# Patient Record
Sex: MALE | Race: WHITE | Employment: FULL TIME | URBAN - NONMETROPOLITAN AREA
[De-identification: names, ages, dates, MRNs, and addresses within clinical notes are randomized per-mention and may not be internally consistent; named-entity substitution may affect disease eponyms.]

---

## 2018-06-19 ENCOUNTER — OFFICE VISIT (OUTPATIENT)
Dept: FAMILY MEDICINE CLINIC | Facility: CLINIC | Age: 33
End: 2018-06-19

## 2018-06-19 VITALS
WEIGHT: 205 LBS | TEMPERATURE: 98 F | OXYGEN SATURATION: 98 % | HEIGHT: 71.5 IN | BODY MASS INDEX: 28.07 KG/M2 | DIASTOLIC BLOOD PRESSURE: 96 MMHG | HEART RATE: 76 BPM | SYSTOLIC BLOOD PRESSURE: 148 MMHG

## 2018-06-19 DIAGNOSIS — R42 ORTHOSTATIC LIGHTHEADEDNESS: Primary | ICD-10-CM

## 2018-06-19 DIAGNOSIS — E86.0 DEHYDRATION: ICD-10-CM

## 2018-06-19 PROCEDURE — 99213 OFFICE O/P EST LOW 20 MIN: CPT | Performed by: FAMILY MEDICINE

## 2018-06-19 NOTE — PROGRESS NOTES
Aiden Velasquez is a 26year old male. Patient presents with:  Dizziness: SAT.  GOLFED ALL DAY-- SUNDAY WAS FINE-- MONDAY WOKE UP FEELING DIZZY, NAUSEA, FATIGUE-    HPI:   Felt off balance yesterday am, nausea, no emesis,3 loose stools, hot flashes, decreas negative Romberg  ASSESSMENT AND PLAN:   Orthostatic lightheadedness  (primary encounter diagnosis)  Dehydration  No orders of the defined types were placed in this encounter.     Meds & Refills for this Visit:  No prescriptions requested or ordered in this

## 2018-06-19 NOTE — PATIENT INSTRUCTIONS
Reviewed signs and symptoms of dehydration. Would recommend Gatorade with an equal amount of water 2-3 times daily. Would recommend high carbohydrate food choices the next several days.   Would recommend that he change positions slowly at least until he f

## 2020-10-27 ENCOUNTER — OFFICE VISIT (OUTPATIENT)
Dept: FAMILY MEDICINE CLINIC | Facility: CLINIC | Age: 35
End: 2020-10-27
Payer: COMMERCIAL

## 2020-10-27 VITALS
BODY MASS INDEX: 30.13 KG/M2 | SYSTOLIC BLOOD PRESSURE: 120 MMHG | HEART RATE: 80 BPM | WEIGHT: 220 LBS | TEMPERATURE: 99 F | HEIGHT: 71.5 IN | RESPIRATION RATE: 18 BRPM | DIASTOLIC BLOOD PRESSURE: 72 MMHG

## 2020-10-27 DIAGNOSIS — Z48.02 ENCOUNTER FOR STAPLE REMOVAL: ICD-10-CM

## 2020-10-27 DIAGNOSIS — S01.01XD LACERATION OF SCALP, SUBSEQUENT ENCOUNTER: Primary | ICD-10-CM

## 2020-10-27 PROCEDURE — 3074F SYST BP LT 130 MM HG: CPT | Performed by: FAMILY MEDICINE

## 2020-10-27 PROCEDURE — 3008F BODY MASS INDEX DOCD: CPT | Performed by: FAMILY MEDICINE

## 2020-10-27 PROCEDURE — 99212 OFFICE O/P EST SF 10 MIN: CPT | Performed by: FAMILY MEDICINE

## 2020-10-27 PROCEDURE — 3078F DIAST BP <80 MM HG: CPT | Performed by: FAMILY MEDICINE

## 2020-10-27 NOTE — PATIENT INSTRUCTIONS
ER records reviewed. Staples removed without difficulty.   Continue routine wound care, follow-up as needed

## 2024-09-24 ENCOUNTER — HOSPITAL ENCOUNTER (OUTPATIENT)
Dept: GENERAL RADIOLOGY | Age: 39
Discharge: HOME | End: 2024-09-24
Attending: NURSE PRACTITIONER
Payer: COMMERCIAL

## 2024-09-24 ENCOUNTER — OFFICE VISIT (OUTPATIENT)
Dept: FAMILY MEDICINE CLINIC | Facility: CLINIC | Age: 39
End: 2024-09-24
Payer: COMMERCIAL

## 2024-09-24 VITALS
BODY MASS INDEX: 31.64 KG/M2 | SYSTOLIC BLOOD PRESSURE: 130 MMHG | OXYGEN SATURATION: 91 % | DIASTOLIC BLOOD PRESSURE: 80 MMHG | RESPIRATION RATE: 16 BRPM | HEART RATE: 96 BPM | TEMPERATURE: 98 F | WEIGHT: 226 LBS | HEIGHT: 71 IN

## 2024-09-24 DIAGNOSIS — R05.1 ACUTE COUGH: ICD-10-CM

## 2024-09-24 DIAGNOSIS — R06.02 SHORTNESS OF BREATH: ICD-10-CM

## 2024-09-24 DIAGNOSIS — R05.1 ACUTE COUGH: Primary | ICD-10-CM

## 2024-09-24 DIAGNOSIS — J18.9 PNEUMONIA OF BOTH LOWER LOBES DUE TO INFECTIOUS ORGANISM: ICD-10-CM

## 2024-09-24 PROCEDURE — 3075F SYST BP GE 130 - 139MM HG: CPT | Performed by: NURSE PRACTITIONER

## 2024-09-24 PROCEDURE — 71046 X-RAY EXAM CHEST 2 VIEWS: CPT | Performed by: NURSE PRACTITIONER

## 2024-09-24 PROCEDURE — 3079F DIAST BP 80-89 MM HG: CPT | Performed by: NURSE PRACTITIONER

## 2024-09-24 PROCEDURE — 3008F BODY MASS INDEX DOCD: CPT | Performed by: NURSE PRACTITIONER

## 2024-09-24 PROCEDURE — 99204 OFFICE O/P NEW MOD 45 MIN: CPT | Performed by: NURSE PRACTITIONER

## 2024-09-24 RX ORDER — ALBUTEROL SULFATE 90 UG/1
1-2 INHALANT RESPIRATORY (INHALATION) EVERY 4 HOURS PRN
Qty: 1 EACH | Refills: 0 | Status: SHIPPED | OUTPATIENT
Start: 2024-09-24 | End: 2024-09-24 | Stop reason: ALTCHOICE

## 2024-09-24 RX ORDER — BENZONATATE 200 MG/1
200 CAPSULE ORAL 2 TIMES DAILY PRN
Qty: 6 CAPSULE | Refills: 0 | Status: ON HOLD | OUTPATIENT
Start: 2024-09-24

## 2024-09-24 RX ORDER — LEVALBUTEROL TARTRATE 45 UG/1
1 AEROSOL, METERED ORAL EVERY 4 HOURS PRN
Qty: 15 G | Refills: 0 | Status: SHIPPED | OUTPATIENT
Start: 2024-09-24 | End: 2024-09-25

## 2024-09-24 RX ORDER — LEVALBUTEROL TARTRATE 45 UG/1
AEROSOL, METERED ORAL
Status: ON HOLD | COMMUNITY
Start: 2024-09-22

## 2024-09-24 RX ORDER — OSELTAMIVIR PHOSPHATE 75 MG/1
75 CAPSULE ORAL 2 TIMES DAILY
COMMUNITY
Start: 2024-09-20 | End: 2024-09-27 | Stop reason: ALTCHOICE

## 2024-09-24 NOTE — PROGRESS NOTES
CHIEF COMPLAINT:    Chief Complaint   Patient presents with    New Patient     Needs forms filled out for work, on the 5th day of Tamiflu, not feeling better, coughing a lot, having issues breathing       HISTORY OF PRESENT ILLNESS:    Ben presents today, September 24, 2024, for follow-up.  September 14, 2024 had upper respiratory infection.  Saw telemed doctors twice.  Was prescribed tamiflu.  Symptoms improved and then worsened.  Symptoms include nasal congestion, body aches, fever, night sweats, loss of appetite, dry cough, and chest congestion.  Some discomfort with taking a deep breath in.  Denies sore throat, left sided chest pain, or near fainting.    ALLERGIES:  No Known Allergies    CURRENT MEDICATIONS:  Current Outpatient Medications   Medication Sig Dispense Refill    Levalbuterol Tartrate 45 MCG/ACT Inhalation Aerosol INHALE 2 PUFFS BY MOUTH UP TO FOUR TIMES DAILY AS NEEDED FOR SHORTNESS OF BREATH      oseltamivir 75 MG Oral Cap Take 1 capsule (75 mg total) by mouth 2 (two) times daily.         MEDICAL HISTORY:  History reviewed. No pertinent past medical history.  History reviewed. No pertinent surgical history.  Family History   Problem Relation Age of Onset    Hypertension Father      Family Status   Relation Status    Fa Alive    Mo Alive    Bro Alive     Social History     Socioeconomic History    Marital status: Single   Tobacco Use    Smoking status: Former     Current packs/day: 0.50     Types: Cigarettes    Smokeless tobacco: Never   Vaping Use    Vaping status: Never Used   Substance and Sexual Activity    Alcohol use: Yes     Alcohol/week: 12.0 standard drinks of alcohol     Types: 12 Cans of beer per week     Comment: socially    Drug use: Not Currently   Other Topics Concern    Caffeine Concern No    Stress Concern No     Social Determinants of Health     Financial Resource Strain: Not At Risk (10/15/2022)    Received from [facility]    Financial Resource Strain      How hard is it for you to pay for the very basics like food, housing, medical care, and heating?: Not hard at all   Food Insecurity: No Food Insecurity (10/15/2022)    Received from [facility]    Food Insecurity     Currently or in the past 3 months, have you worried your food would run out before you had money to buy more?: No     In the past 12 months, have you run out of food or been unable to get more?: No   Transportation Needs: No Transportation Needs (10/15/2022)    Received from [facility]    Transportation Needs     Medical Transportation Needs?: Patient refused     Daily Living Transportation Needs? [Peds Only] : Patient refused    Received from [facility]    Housing Stability       ROS:  GENERAL:  +fevers  RESPIRATORY:  Denies difficulty breathing  CARDIAC:  Denies chest pain with exertion      VITALS:   /80   Pulse 96   Temp 97.7 °F (36.5 °C) (Temporal)   Resp 16   Ht 5' 11\" (1.803 m)   Wt 226 lb (102.5 kg)   SpO2 91%   BMI 31.52 kg/m²     Reviewed by Ysabel Gill MS, APRN, FNP-BC    PHYSICAL EXAM:    Constitutional:       Appears well.  Sitting upright on exam table.  Well developed, well nourished, and in no acute distress  HEENT:      Facial features symmetric. Normocephalic and atraumatic  Cardiovascular:      Heart sounds: Regular rate and rhythm without murmur      No edema of BLE  Pulmonary:      Chest expansion symmetric.  Breathing nonlabored. Lungs coarse throughout     Dry cough.  Musculoskeletal:         Movements smooth and controlled with appropriate coordination.       Gait is steady, nonantalgic.  Neuro:       No focal deficits, cranial nerves grossly intact.       Movements smooth and controlled, appropriate coordination without ataxia or tremors.  Skin:     Warm and dry without jaundice or rashes.  Psychiatric:         Alert and oriented.  Calm and cooperative.  Speech is clear.     ASSESSMENT & PLAN:    [place]  and work note forms completed     1. Acute cough  - XR CHEST PA + LAT CHEST (CPT=71046); Future  - amoxicillin clavulanate 875-125 MG Oral Tab; Take 1 tablet by mouth 2 (two) times daily for 7 days.  Dispense: 14 tablet; Refill: 0  - benzonatate 200 MG Oral Cap; Take 1 capsule (200 mg total) by mouth 2 (two) times daily as needed.  Dispense: 6 capsule; Refill: 0  - Levalbuterol Tartrate 45 MCG/ACT Inhalation Aerosol; Inhale 1 puff into the lungs every 4 (four) hours as needed for Wheezing or Shortness of Breath.  Dispense: 15 g; Refill: 0    2. Shortness of breath  - XR CHEST PA + LAT CHEST (CPT=71046); Future  - amoxicillin clavulanate 875-125 MG Oral Tab; Take 1 tablet by mouth 2 (two) times daily for 7 days.  Dispense: 14 tablet; Refill: 0  - Levalbuterol Tartrate 45 MCG/ACT Inhalation Aerosol; Inhale 1 puff into the lungs every 4 (four) hours as needed for Wheezing or Shortness of Breath.  Dispense: 15 g; Refill: 0    3. Pneumonia of both lower lobes due to infectious organism  - amoxicillin clavulanate 875-125 MG Oral Tab; Take 1 tablet by mouth 2 (two) times daily for 7 days.  Dispense: 14 tablet; Refill: 0  - Levalbuterol Tartrate 45 MCG/ACT Inhalation Aerosol; Inhale 1 puff into the lungs every 4 (four) hours as needed for Wheezing or Shortness of Breath.  Dispense: 15 g; Refill: 0

## 2024-09-25 RX ORDER — ALBUTEROL SULFATE 90 UG/1
1 INHALANT RESPIRATORY (INHALATION) EVERY 4 HOURS PRN
Qty: 8 G | Refills: 0 | Status: ON HOLD | OUTPATIENT
Start: 2024-09-25

## 2024-09-27 ENCOUNTER — HOSPITAL ENCOUNTER (INPATIENT)
Facility: HOSPITAL | Age: 39
LOS: 8 days | Discharge: HOME | End: 2024-10-05
Attending: EMERGENCY MEDICINE | Admitting: HOSPITALIST
Payer: COMMERCIAL

## 2024-09-27 ENCOUNTER — APPOINTMENT (OUTPATIENT)
Dept: GENERAL RADIOLOGY | Facility: HOSPITAL | Age: 39
End: 2024-09-27
Attending: EMERGENCY MEDICINE
Payer: COMMERCIAL

## 2024-09-27 ENCOUNTER — OFFICE VISIT (OUTPATIENT)
Dept: FAMILY MEDICINE CLINIC | Facility: CLINIC | Age: 39
End: 2024-09-27
Payer: COMMERCIAL

## 2024-09-27 VITALS
WEIGHT: 226 LBS | BODY MASS INDEX: 31.64 KG/M2 | SYSTOLIC BLOOD PRESSURE: 124 MMHG | HEART RATE: 80 BPM | DIASTOLIC BLOOD PRESSURE: 78 MMHG | HEIGHT: 71 IN | OXYGEN SATURATION: 88 % | RESPIRATION RATE: 16 BRPM | TEMPERATURE: 97 F

## 2024-09-27 DIAGNOSIS — R05.1 ACUTE COUGH: Primary | ICD-10-CM

## 2024-09-27 DIAGNOSIS — J18.9 PNEUMONIA OF BOTH LUNGS DUE TO INFECTIOUS ORGANISM, UNSPECIFIED PART OF LUNG: ICD-10-CM

## 2024-09-27 DIAGNOSIS — J18.9 PNEUMONIA OF BOTH LOWER LOBES DUE TO INFECTIOUS ORGANISM: ICD-10-CM

## 2024-09-27 DIAGNOSIS — R06.02 SHORTNESS OF BREATH: ICD-10-CM

## 2024-09-27 DIAGNOSIS — R09.02 HYPOXIA: Primary | ICD-10-CM

## 2024-09-27 LAB
ADENOVIRUS PCR:: NOT DETECTED
ALBUMIN SERPL-MCNC: 4.5 G/DL (ref 3.2–4.8)
ALBUMIN/GLOB SERPL: 1.4 {RATIO} (ref 1–2)
ALP LIVER SERPL-CCNC: 87 U/L
ALT SERPL-CCNC: 31 U/L
ANION GAP SERPL CALC-SCNC: 6 MMOL/L (ref 0–18)
AST SERPL-CCNC: 30 U/L (ref ?–34)
B PARAPERT DNA SPEC QL NAA+PROBE: NOT DETECTED
B PERT DNA SPEC QL NAA+PROBE: NOT DETECTED
BASOPHILS # BLD AUTO: 0.05 X10(3) UL (ref 0–0.2)
BASOPHILS NFR BLD AUTO: 0.5 %
BILIRUB SERPL-MCNC: 0.4 MG/DL (ref 0.3–1.2)
BUN BLD-MCNC: 8 MG/DL (ref 9–23)
C PNEUM DNA SPEC QL NAA+PROBE: NOT DETECTED
CALCIUM BLD-MCNC: 9.6 MG/DL (ref 8.7–10.4)
CHLORIDE SERPL-SCNC: 107 MMOL/L (ref 98–112)
CO2 SERPL-SCNC: 25 MMOL/L (ref 21–32)
CORONAVIRUS 229E PCR:: NOT DETECTED
CORONAVIRUS HKU1 PCR:: NOT DETECTED
CORONAVIRUS NL63 PCR:: NOT DETECTED
CORONAVIRUS OC43 PCR:: NOT DETECTED
CREAT BLD-MCNC: 0.9 MG/DL
EGFRCR SERPLBLD CKD-EPI 2021: 110 ML/MIN/1.73M2 (ref 60–?)
EOSINOPHIL # BLD AUTO: 0.25 X10(3) UL (ref 0–0.7)
EOSINOPHIL NFR BLD AUTO: 2.7 %
ERYTHROCYTE [DISTWIDTH] IN BLOOD BY AUTOMATED COUNT: 12.7 %
FLUAV + FLUBV RNA SPEC NAA+PROBE: NEGATIVE
FLUAV + FLUBV RNA SPEC NAA+PROBE: NEGATIVE
FLUAV RNA SPEC QL NAA+PROBE: NOT DETECTED
FLUBV RNA SPEC QL NAA+PROBE: NOT DETECTED
GLOBULIN PLAS-MCNC: 3.3 G/DL (ref 2–3.5)
GLUCOSE BLD-MCNC: 106 MG/DL (ref 70–99)
HCT VFR BLD AUTO: 46.1 %
HGB BLD-MCNC: 15.7 G/DL
IMM GRANULOCYTES # BLD AUTO: 0.14 X10(3) UL (ref 0–1)
IMM GRANULOCYTES NFR BLD: 1.5 %
LACTATE SERPL-SCNC: 0.8 MMOL/L (ref 0.5–2)
LYMPHOCYTES # BLD AUTO: 1.6 X10(3) UL (ref 1–4)
LYMPHOCYTES NFR BLD AUTO: 17.3 %
MCH RBC QN AUTO: 29.5 PG (ref 26–34)
MCHC RBC AUTO-ENTMCNC: 34.1 G/DL (ref 31–37)
MCV RBC AUTO: 86.5 FL
METAPNEUMOVIRUS PCR:: NOT DETECTED
MONOCYTES # BLD AUTO: 0.37 X10(3) UL (ref 0.1–1)
MONOCYTES NFR BLD AUTO: 4 %
MYCOPLASMA PNEUMONIA PCR:: NOT DETECTED
NEUTROPHILS # BLD AUTO: 6.85 X10 (3) UL (ref 1.5–7.7)
NEUTROPHILS # BLD AUTO: 6.85 X10(3) UL (ref 1.5–7.7)
NEUTROPHILS NFR BLD AUTO: 74 %
OSMOLALITY SERPL CALC.SUM OF ELEC: 285 MOSM/KG (ref 275–295)
PARAINFLUENZA 1 PCR:: NOT DETECTED
PARAINFLUENZA 2 PCR:: NOT DETECTED
PARAINFLUENZA 3 PCR:: NOT DETECTED
PARAINFLUENZA 4 PCR:: NOT DETECTED
PLATELET # BLD AUTO: 375 10(3)UL (ref 150–450)
POTASSIUM SERPL-SCNC: 3.7 MMOL/L (ref 3.5–5.1)
PROT SERPL-MCNC: 7.8 G/DL (ref 5.7–8.2)
RBC # BLD AUTO: 5.33 X10(6)UL
RHINOVIRUS/ENTERO PCR:: NOT DETECTED
RSV RNA SPEC NAA+PROBE: NEGATIVE
RSV RNA SPEC QL NAA+PROBE: NOT DETECTED
SARS-COV-2 RNA NPH QL NAA+NON-PROBE: NOT DETECTED
SARS-COV-2 RNA RESP QL NAA+PROBE: NOT DETECTED
SODIUM SERPL-SCNC: 138 MMOL/L (ref 136–145)
WBC # BLD AUTO: 9.3 X10(3) UL (ref 4–11)

## 2024-09-27 PROCEDURE — 99213 OFFICE O/P EST LOW 20 MIN: CPT | Performed by: NURSE PRACTITIONER

## 2024-09-27 PROCEDURE — 71045 X-RAY EXAM CHEST 1 VIEW: CPT | Performed by: EMERGENCY MEDICINE

## 2024-09-27 PROCEDURE — 5A0955A ASSISTANCE WITH RESPIRATORY VENTILATION, GREATER THAN 96 CONSECUTIVE HOURS, HIGH NASAL FLOW/VELOCITY: ICD-10-PCS | Performed by: HOSPITALIST

## 2024-09-27 PROCEDURE — 3074F SYST BP LT 130 MM HG: CPT | Performed by: NURSE PRACTITIONER

## 2024-09-27 PROCEDURE — 3078F DIAST BP <80 MM HG: CPT | Performed by: NURSE PRACTITIONER

## 2024-09-27 PROCEDURE — 99223 1ST HOSP IP/OBS HIGH 75: CPT | Performed by: HOSPITALIST

## 2024-09-27 PROCEDURE — 3008F BODY MASS INDEX DOCD: CPT | Performed by: NURSE PRACTITIONER

## 2024-09-27 RX ORDER — ACETAMINOPHEN 500 MG
1000 TABLET ORAL EVERY 4 HOURS PRN
Status: DISCONTINUED | OUTPATIENT
Start: 2024-09-27 | End: 2024-10-05

## 2024-09-27 RX ORDER — HYDROCODONE BITARTRATE AND HOMATROPINE METHYLBROMIDE ORAL SOLUTION 5; 1.5 MG/5ML; MG/5ML
5 LIQUID ORAL EVERY 4 HOURS PRN
Status: DISCONTINUED | OUTPATIENT
Start: 2024-09-27 | End: 2024-10-05

## 2024-09-27 RX ORDER — IPRATROPIUM BROMIDE AND ALBUTEROL SULFATE 2.5; .5 MG/3ML; MG/3ML
3 SOLUTION RESPIRATORY (INHALATION) ONCE
Status: COMPLETED | OUTPATIENT
Start: 2024-09-27 | End: 2024-09-27

## 2024-09-27 RX ORDER — KETOROLAC TROMETHAMINE 15 MG/ML
15 INJECTION, SOLUTION INTRAMUSCULAR; INTRAVENOUS ONCE
Status: COMPLETED | OUTPATIENT
Start: 2024-09-27 | End: 2024-09-27

## 2024-09-27 RX ORDER — ENOXAPARIN SODIUM 100 MG/ML
40 INJECTION SUBCUTANEOUS DAILY
Status: DISCONTINUED | OUTPATIENT
Start: 2024-09-27 | End: 2024-10-05

## 2024-09-27 RX ORDER — BENZONATATE 200 MG/1
200 CAPSULE ORAL 3 TIMES DAILY
Status: DISCONTINUED | OUTPATIENT
Start: 2024-09-27 | End: 2024-10-05

## 2024-09-27 RX ORDER — ONDANSETRON 2 MG/ML
4 INJECTION INTRAMUSCULAR; INTRAVENOUS EVERY 6 HOURS PRN
Status: DISCONTINUED | OUTPATIENT
Start: 2024-09-27 | End: 2024-10-05

## 2024-09-27 RX ORDER — IBUPROFEN 200 MG
800 TABLET ORAL EVERY 8 HOURS PRN
COMMUNITY

## 2024-09-27 RX ORDER — BUDESONIDE 0.5 MG/2ML
0.5 INHALANT ORAL
Status: DISCONTINUED | OUTPATIENT
Start: 2024-09-27 | End: 2024-10-05

## 2024-09-27 RX ORDER — SENNOSIDES 8.6 MG
17.2 TABLET ORAL NIGHTLY PRN
Status: DISCONTINUED | OUTPATIENT
Start: 2024-09-27 | End: 2024-10-05

## 2024-09-27 RX ORDER — METHYLPREDNISOLONE SODIUM SUCCINATE 125 MG/2ML
125 INJECTION, POWDER, LYOPHILIZED, FOR SOLUTION INTRAMUSCULAR; INTRAVENOUS ONCE
Status: COMPLETED | OUTPATIENT
Start: 2024-09-27 | End: 2024-09-27

## 2024-09-27 RX ORDER — ECHINACEA PURPUREA EXTRACT 125 MG
1 TABLET ORAL
Status: DISCONTINUED | OUTPATIENT
Start: 2024-09-27 | End: 2024-10-05

## 2024-09-27 RX ORDER — BISACODYL 10 MG
10 SUPPOSITORY, RECTAL RECTAL
Status: DISCONTINUED | OUTPATIENT
Start: 2024-09-27 | End: 2024-10-05

## 2024-09-27 RX ORDER — GUAIFENESIN 600 MG/1
1200 TABLET, EXTENDED RELEASE ORAL 2 TIMES DAILY
Status: DISCONTINUED | OUTPATIENT
Start: 2024-09-27 | End: 2024-10-05

## 2024-09-27 RX ORDER — METHYLPREDNISOLONE SODIUM SUCCINATE 40 MG/ML
40 INJECTION, POWDER, LYOPHILIZED, FOR SOLUTION INTRAMUSCULAR; INTRAVENOUS EVERY 8 HOURS
Status: DISCONTINUED | OUTPATIENT
Start: 2024-09-27 | End: 2024-09-28

## 2024-09-27 RX ORDER — ALBUTEROL SULFATE 0.83 MG/ML
2.5 SOLUTION RESPIRATORY (INHALATION)
Status: DISCONTINUED | OUTPATIENT
Start: 2024-09-27 | End: 2024-09-28

## 2024-09-27 RX ORDER — SODIUM PHOSPHATE, DIBASIC AND SODIUM PHOSPHATE, MONOBASIC 7; 19 G/230ML; G/230ML
1 ENEMA RECTAL ONCE AS NEEDED
Status: DISCONTINUED | OUTPATIENT
Start: 2024-09-27 | End: 2024-10-05

## 2024-09-27 RX ORDER — MELATONIN
3 NIGHTLY PRN
Status: DISCONTINUED | OUTPATIENT
Start: 2024-09-27 | End: 2024-10-05

## 2024-09-27 RX ORDER — POLYETHYLENE GLYCOL 3350 17 G/17G
17 POWDER, FOR SOLUTION ORAL DAILY PRN
Status: DISCONTINUED | OUTPATIENT
Start: 2024-09-27 | End: 2024-10-05

## 2024-09-27 RX ORDER — PROCHLORPERAZINE EDISYLATE 5 MG/ML
5 INJECTION INTRAMUSCULAR; INTRAVENOUS EVERY 8 HOURS PRN
Status: DISCONTINUED | OUTPATIENT
Start: 2024-09-27 | End: 2024-10-05

## 2024-09-27 NOTE — ED QUICK NOTES
Patient's wife states she and her  child had Flu A last week. The patient developed cough, fever on 9/24. Went to PCP and chest x-ray showed bilateral pneumonia. Started on Augmentin. Patient was feeling better yesterday. Worse today. 82% RA upon arrival. Moist, congested cough with bilateral wheezing and rhonchi. Patient states fever, night sweats, body aches. Went back to PCP today, sent to ER for hypoxia. 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

## 2024-09-27 NOTE — ED PROVIDER NOTES
Patient Seen in: [facility] 4nw-a      History     Chief Complaint   Patient presents with    Difficulty Breathing     Stated Complaint: sob    Subjective:   HPI    Patient for worsening shortness of breath.  Probably as well family had an upper respiratory infection a couple weeks ago, wife had tested positive for the flu in fact.  As they were getting better he developed the symptoms and they have been progressive.  No chest pain no fevers but persisting cough and worsening shortness of breath.  Was diagnosed with pneumonia few days ago started on Augmentin, due to progressive symptoms and hypoxia was sent here.      Smoked for 20 years but quit 7 years ago.    Objective:   Past Medical History:    Childhood asthma (HCC)              History reviewed. No pertinent surgical history.             Social History     Socioeconomic History    Marital status: Single   Tobacco Use    Smoking status: Former     Current packs/day: 0.50     Types: Cigarettes    Smokeless tobacco: Never   Vaping Use    Vaping status: Never Used   Substance and Sexual Activity    Alcohol use: Yes     Alcohol/week: 12.0 standard drinks of alcohol     Types: 12 Cans of beer per week     Comment: socially    Drug use: Not Currently   Other Topics Concern    Caffeine Concern No    Stress Concern No     Social Determinants of Health     Financial Resource Strain: Not At Risk (10/15/2022)    Received from [facility]    Financial Resource Strain     How hard is it for you to pay for the very basics like food, housing, medical care, and heating?: Not hard at all   Food Insecurity: No Food Insecurity (9/27/2024)    Food Insecurity     Food Insecurity: Never true   Transportation Needs: No Transportation Needs (9/27/2024)    Transportation Needs     Lack of Transportation: No   Housing Stability: Low Risk  (9/27/2024)    Housing Stability     Housing Instability: No              Review of Systems    Positive for stated Chief  Complaint: Difficulty Breathing    Other systems are as noted in HPI.  Constitutional and vital signs reviewed.      All other systems reviewed and negative except as noted above.    Physical Exam     ED Triage Vitals [09/27/24 1126]   BP (!) 158/98   Pulse 87   Resp 24   Temp 98.2 °F (36.8 °C)   Temp src Temporal   SpO2 (!) 82 %   O2 Device None (Room air)       Current Vitals:   Vital Signs  BP: 134/89  Pulse: 80  Resp: 21  Temp: 97.6 °F (36.4 °C)  Temp src: Oral  MAP (mmHg): 99    Oxygen Therapy  SpO2: 91 %  O2 Device: Nasal cannula  O2 Flow Rate (L/min): 6 L/min            Physical Exam    Physical Exam   Constitutional: Awake, alert, well appearing  Head: Normocephalic and atraumatic.   Eyes: Conjunctivae are normal. Pupils are equal, round, and reactive to light.   Neck: Normal range of motion. No JVD  Cardiovascular: Normal rate, regular rhythm  Pulmonary/Chest: Normal effort.  No accessory muscle use.  No cyanosis.  Abdominal: Soft. Not distended.  Neurological: Pt is alert and oriented to person, place, and time. no cranial nerve deficits. Speech fluent    Diffuse rales and wheezes throughout  With decent air exchange.    ED Course     Labs Reviewed   COMP METABOLIC PANEL (14) - Abnormal; Notable for the following components:       Result Value    Glucose 106 (*)     BUN 8 (*)     All other components within normal limits   LACTIC ACID, PLASMA - Normal   SARS-COV-2/FLU A AND B/RSV BY PCR (GENEXPERT) - Normal    Narrative:     This test is intended for the qualitative detection and differentiation of SARS-CoV-2, influenza A, influenza B, and respiratory syncytial virus (RSV) viral RNA in nasopharyngeal or nares swabs from individuals suspected of respiratory viral infection consistent with COVID-19 by their healthcare provider. Signs and symptoms of respiratory viral infection due to SARS-CoV-2, influenza, and RSV can be similar.    Test performed using the Xpert Xpress SARS-CoV-2/FLU/RSV (real time RT-PCR)   assay on the GeneXpert instrument, [employer], [place] [address].   This test is being used under the Food and Drug Administration's Emergency Use Authorization.    The authorized Fact Sheet for Healthcare Providers for this assay is available upon request from the laboratory.   CBC WITH DIFFERENTIAL WITH PLATELET   LEGIONELLA URINE AG SEROGRP 1   RAINBOW DRAW LAVENDER   RAINBOW DRAW LIGHT GREEN   RAINBOW DRAW BLUE   BLOOD CULTURE   BLOOD CULTURE   SPUTUM CULTURE   RESPIRATORY FLU EXPAND PANEL + COVID-19   MRSA CULTURE ONLY             XR CHEST AP PORTABLE  (CPT=71045)    Result Date: 9/27/2024  CONCLUSION:  Slight worsening of patchy ground-glass opacities within the mid and lower lung could be due to worsening infectious or inflammatory process.   LOCATION:  [facility]      Dictated by (CST): Franklyn Emerson MD on 9/27/2024 at 12:24 PM     Finalized by (CST): Franklyn Emerson MD on 9/27/2024 at 12:28 PM          Patient was given a nebulizer treatment with some improvement.         MDM              Differential diagnoses considered: Bacterial pneumonia, viral pneumonia, pneumonitis, acute asthma exacerbation/bronchospasm all considered.    -Concern for secondary pneumonia on top of what ever viral illness he recently had versus pneumonitis.    -Rocephin azithromycin    -Scheduled nebs    -Discussed with Dr. Stewart, requested IV steroids as ordered.  Patient will be admitted primarily to the [facility] hospitalist.          I visualized the radiology studies, my independent interpretation: Progressive inflammatory changes throughout his lungs    *Discussion of ongoing management of this patient's care included: Admitting physician    Shared decision making was done by: patient, myself.    Admission disposition: 9/27/2024 12:36 PM                                        Medical Decision Making      Disposition and Plan     Clinical Impression:  1. Hypoxia    2. Pneumonia of both lungs due to infectious organism, unspecified part  of lung         Disposition:  Admit  9/27/2024 12:36 pm    Follow-up:  No follow-up provider specified.        Medications Prescribed:  Current Discharge Medication List                            Hospital Problems       Present on Admission  Date Reviewed: 9/27/2024            ICD-10-CM Noted POA    * (Principal) Hypoxia R09.02 9/27/2024 Unknown    Pneumonia of both lungs due to infectious organism, unspecified part of lung J18.9 9/27/2024 Unknown

## 2024-09-27 NOTE — H&P
[facility]     Ben Lanza Patient Status:  Inpatient    1985 MRN [id number]   Location [facility] 4NW-A Attending Gabriele Stewart MD   Hosp Day # 0 PCP Larry Pisano MD     Chief Complaint:   Chief Complaint   Patient presents with    Difficulty Breathing       Subjective:    History of Present Illness:     Ben Lanza is a 39 year old male with a past medical history of chilhood asthma.  He smoked from age 13 until about 7 years ago.  +exposure to influenza at home. Family was improving and he was out of town.  Upon returning to [place] he started to have URI symptoms.  He saw his PCP and was started on PO ABX and inhalers.  He started to have increasing dyspnea and was referred to the ED due to hypoxia.      History/Other:    Past Medical History:  Past Medical History:    Childhood asthma (HCC)     Past Surgical History:   History reviewed. No pertinent surgical history.   Family History:   Family History   Problem Relation Age of Onset    Hypertension Father     Anxiety Brother      Social History:    reports that he has quit smoking. His smoking use included cigarettes. He has never used smokeless tobacco. He reports current alcohol use of about 12.0 standard drinks of alcohol per week. He reports that he does not currently use drugs.     Allergies: No Known Allergies    Medications:    No current facility-administered medications on file prior to encounter.     Current Outpatient Medications on File Prior to Encounter   Medication Sig Dispense Refill    ibuprofen 200 MG Oral Tab Take 4 tablets (800 mg total) by mouth every 8 (eight) hours as needed for Pain.      Levalbuterol Tartrate 45 MCG/ACT Inhalation Aerosol INHALE 2 PUFFS BY MOUTH UP TO FOUR TIMES DAILY AS NEEDED FOR SHORTNESS OF BREATH      amoxicillin clavulanate 875-125 MG Oral Tab Take 1 tablet by mouth 2 (two) times daily for 7 days. 14 tablet 0    benzonatate 200 MG Oral Cap Take 1  capsule (200 mg total) by mouth 2 (two) times daily as needed. 6 capsule 0    albuterol 108 (90 Base) MCG/ACT Inhalation Aero Soln Inhale 1 puff into the lungs every 4 (four) hours as needed for Wheezing or Shortness of Breath. (Patient not taking: Reported on 9/27/2024) 8 g 0       Review of Systems:   A comprehensive review of systems was completed.    Pertinent positives and negatives noted in the HPI.    Objective:   Physical Exam:    /90   Pulse 98   Temp 98.2 °F (36.8 °C) (Temporal)   Resp 21   Ht 5' 11\" (1.803 m)   Wt 230 lb (104.3 kg)   SpO2 91%   BMI 32.08 kg/m²   General: No acute distress, Alert  Respiratory: +rhonchi and intermittent wheezes  Cardiovascular: S1, S2.   Abdomen: Soft, Non-tender, Non-distended, Positive bowel sounds  Neuro: No new focal deficits  Extremities: No edema      Results:    Labs:      Labs Last 24 Hours:  Recent Labs   Lab 09/27/24  1149   WBC 9.3   HGB 15.7   MCV 86.5   .0       Recent Labs   Lab 09/27/24  1149   *   BUN 8*   CREATSERUM 0.90   CA 9.6   ALB 4.5      K 3.7      CO2 25.0   ALKPHO 87   AST 30   ALT 31   BILT 0.4   TP 7.8       Estimated Creatinine Clearance: 115 mL/min (based on SCr of 0.9 mg/dL).    No results for input(s): \"TROP\", \"TROPHS\", \"CK\" in the last 168 hours.    No results for input(s): \"PTP\", \"INR\" in the last 168 hours.    No results for input(s): \"TROP\", \"CK\" in the last 168 hours.      Imaging: Imaging data reviewed in Epic.    Assessment & Plan:      #PNA/bronchitis with bronchospasms  Suspect viral etiology  PPX ABX  RVP  Nebs  Add budesonide  Solumedrol  CT chest if not improving        All diagnosis' and recommendations discussed with patient and/or family in detail.      Plan of care discussed with ED physician      Gabriele Stewart MD    Supplementary Documentation:     The 21st Century Cures Act makes medical notes like these available to patients in the interest of transparency. Please be advised this is a  medical document. Medical documents are intended to carry relevant information, facts as evident, and the clinical opinion of the practitioner. The medical note is intended as peer to peer communication and may appear blunt or direct. It is written in medical language and may contain abbreviations or verbiage that are unfamiliar.

## 2024-09-27 NOTE — ED QUICK NOTES
Orders for admission, patient is aware of plan and ready to go upstairs. Any questions, please call ED RN Robyn at extension [phone].     Patient Covid vaccination status: Fully vaccinated     COVID Test Ordered in ED: SARS-CoV-2/Flu A and B/RSV by PCR (GeneXpert)    COVID Suspicion at Admission: N/A    Running Infusions:  None    Mental Status/LOC at time of transport: A&OX4. Independent with ADLs.    Other pertinent information:   CIWA score: N/A   NIH score:  N/A

## 2024-09-27 NOTE — ED INITIAL ASSESSMENT (HPI)
Diag with pneumonia on 24 th and per md office low o2 sats and pt not getting better so sent to er .  Pt ambulatory to er desk

## 2024-09-27 NOTE — PROGRESS NOTES
CHIEF COMPLAINT:    Chief Complaint   Patient presents with    Follow - Up     Pneumonia follow up       HISTORY OF PRESENT ILLNESS:    Ben who has a history of asthma in childhood and a former smoker presents today, September 27, 2024, for follow-up on pneumonia.      September 14, 2024 had upper respiratory infection.  Saw telemed doctors twice for sinus infection and influenza.  Was prescribed tamiflu.  Symptoms improved and then worsened.  Symptoms include nasal congestion, body aches, fever, night sweats, loss of appetite, dry cough, and chest congestion.  Some discomfort with taking a deep breath in.  Denies sore throat, left sided chest pain, or near fainting.    CXR on September 24th revealed consolidation to bilateral lower lobes.  Began treatment with augmentin 875mg BID without significant improvement.  Reports feeling better on September 26th, but is feeling worse today.  Pain with deep breathing and shortness of breath. Using levalbuterol over albuterol as it makes him feel less jittery.  Last dose this morning before appointment.  Denies near fainting.  Most severe symptoms is pain with breathing.    Due to oxygen saturation of 88% on room air and deteriorating condition supported by significantly diminished lung sounds of right lung. Recommending ER visit at [facility].  Ben is agreeable, wife will take him to the ER.    ALLERGIES:  No Known Allergies    CURRENT MEDICATIONS:  Current Outpatient Medications   Medication Sig Dispense Refill    albuterol 108 (90 Base) MCG/ACT Inhalation Aero Soln Inhale 1 puff into the lungs every 4 (four) hours as needed for Wheezing or Shortness of Breath. 8 g 0    Levalbuterol Tartrate 45 MCG/ACT Inhalation Aerosol INHALE 2 PUFFS BY MOUTH UP TO FOUR TIMES DAILY AS NEEDED FOR SHORTNESS OF BREATH      amoxicillin clavulanate 875-125 MG Oral Tab Take 1 tablet by mouth 2 (two) times daily for 7 days. 14 tablet 0    benzonatate 200 MG Oral Cap Take 1 capsule (200 mg  total) by mouth 2 (two) times daily as needed. 6 capsule 0       MEDICAL HISTORY:  No past medical history on file.  No past surgical history on file.  Family History   Problem Relation Age of Onset    Hypertension Father     Anxiety Brother      Family Status   Relation Status    Mo Alive    Fa Alive    Bro Alive     Social History     Socioeconomic History    Marital status: Single   Tobacco Use    Smoking status: Former     Current packs/day: 0.50     Types: Cigarettes    Smokeless tobacco: Never   Vaping Use    Vaping status: Never Used   Substance and Sexual Activity    Alcohol use: Yes     Alcohol/week: 12.0 standard drinks of alcohol     Types: 12 Cans of beer per week     Comment: socially    Drug use: Not Currently   Other Topics Concern    Caffeine Concern No    Stress Concern No     Social Determinants of Health     Financial Resource Strain: Not At Risk (10/15/2022)    Received from [facility]    Financial Resource Strain     How hard is it for you to pay for the very basics like food, housing, medical care, and heating?: Not hard at all   Food Insecurity: No Food Insecurity (10/15/2022)    Received from [facility]    Food Insecurity     Currently or in the past 3 months, have you worried your food would run out before you had money to buy more?: No     In the past 12 months, have you run out of food or been unable to get more?: No   Transportation Needs: No Transportation Needs (10/15/2022)    Received from [facility]    Transportation Needs     Medical Transportation Needs?: Patient refused     Daily Living Transportation Needs? [Peds Only] : Patient refused    Received from [facility]    Housing Stability       ROS:  GENERAL:  +fevers  RESPIRATORY:  +pain with inhalation  CARDIAC:  Denies left sided chest pain with exertion or near fainting    VITALS:   /78   Pulse 80   Temp 97 °F (36.1 °C) (Temporal)   Resp 16     5' 11\" (1.803 m)   Wt 226 lb (102.5 kg)   SpO2 (!) 88%   BMI 31.52 kg/m²   SpO2 rechecked, highest reading of 91% on room air  Reviewed by Ysabel Gill MS, APRN, FNP-BC    PHYSICAL EXAM:    Constitutional:       Appears well, speaking in full sentences without difficulty.  Tolerating position changes without loss of balance.  Sitting upright on exam table.  Well developed, well nourished, and in no acute distress  HEENT:      Facial features symmetric. Normocephalic and atraumatic  Pulmonary:      Chest expansion symmetric.  Breathing nonlabored. Lungs coarse throughout, significanly diminshed lung sounds of right lung.  Moist intermittent cough.  Musculoskeletal:         Movements smooth and controlled with appropriate coordination.       Gait is steady, nonantalgic.  Neuro:       No focal deficits, cranial nerves grossly intact.       Movements smooth and controlled, appropriate coordination without ataxia or tremors.  Skin:     Warm and dry without jaundice or rashes.  Psychiatric:         Alert and oriented.  Calm and cooperative.  Speech is clear.     ASSESSMENT & PLAN:    1. Acute cough  2. Shortness of breath  3. Pneumonia of both lower lobes due to infectious organism    Worsening symptoms, limited improvement  Some improvement yesterday with Augmentin 875mg BID, reports feeling worse today.  Last dose of levalbuterol this morning.    Due to oxygen saturation of 88% on room air and deteriorating condition supported by significantly diminished lung sounds of right lung. Recommending ER visit at main [place].  Ben is agreeable, wife will take him to the ER.

## 2024-09-28 ENCOUNTER — APPOINTMENT (OUTPATIENT)
Dept: CT IMAGING | Facility: HOSPITAL | Age: 39
End: 2024-09-28
Attending: HOSPITALIST
Payer: COMMERCIAL

## 2024-09-28 PROBLEM — J96.01 ACUTE HYPOXIC RESPIRATORY FAILURE (HCC): Status: ACTIVE | Noted: 2024-09-28

## 2024-09-28 PROBLEM — J45.51 SEVERE PERSISTENT ASTHMA WITH EXACERBATION (HCC): Status: ACTIVE | Noted: 2024-09-28

## 2024-09-28 LAB
ARTERIAL PATENCY WRIST A: POSITIVE
BASE EXCESS BLDA CALC-SCNC: 2.5 MMOL/L (ref ?–2)
BODY TEMPERATURE: 98.6 F
CA-I BLD-SCNC: 1.24 MMOL/L (ref 0.95–1.32)
COHGB MFR BLD: 1.5 % SAT (ref 0–3)
CRP SERPL-MCNC: 3.4 MG/DL (ref ?–0.5)
ERYTHROCYTE [SEDIMENTATION RATE] IN BLOOD: 49 MM/HR
HCO3 BLDA-SCNC: 26.7 MEQ/L (ref 21–27)
HGB BLD-MCNC: 15.4 G/DL
L PNEUMO AG UR QL: NEGATIVE
L/M: 10 L/MIN
LACTATE BLD-SCNC: 1.7 MMOL/L (ref 0.5–2)
METHGB MFR BLD: 0.3 % SAT (ref 0.4–1.5)
NT-PROBNP SERPL-MCNC: 103 PG/ML (ref ?–125)
OXYHGB MFR BLDA: 92.5 % (ref 92–100)
PCO2 BLDA: 32 MM HG (ref 35–45)
PH BLDA: 7.5 [PH] (ref 7.35–7.45)
PO2 BLDA: 62 MM HG (ref 80–100)
POTASSIUM BLD-SCNC: 4.2 MMOL/L (ref 3.6–5.1)
PROCALCITONIN SERPL-MCNC: 0.1 NG/ML (ref ?–0.05)
SODIUM BLD-SCNC: 133 MMOL/L (ref 135–145)
STREP PNEUMO ANTIGEN, URINE: NEGATIVE

## 2024-09-28 PROCEDURE — 99233 SBSQ HOSP IP/OBS HIGH 50: CPT | Performed by: HOSPITALIST

## 2024-09-28 PROCEDURE — 99255 IP/OBS CONSLTJ NEW/EST HI 80: CPT | Performed by: INTERNAL MEDICINE

## 2024-09-28 PROCEDURE — 71275 CT ANGIOGRAPHY CHEST: CPT | Performed by: HOSPITALIST

## 2024-09-28 RX ORDER — VANCOMYCIN 1.75 GRAM/500 ML IN 0.9 % SODIUM CHLORIDE INTRAVENOUS
1750 EVERY 12 HOURS
Status: DISCONTINUED | OUTPATIENT
Start: 2024-09-28 | End: 2024-09-29

## 2024-09-28 RX ORDER — ALBUTEROL SULFATE 0.83 MG/ML
2.5 SOLUTION RESPIRATORY (INHALATION)
Status: DISCONTINUED | OUTPATIENT
Start: 2024-09-28 | End: 2024-10-03

## 2024-09-28 RX ORDER — VANCOMYCIN HYDROCHLORIDE
15 EVERY 12 HOURS
Status: DISCONTINUED | OUTPATIENT
Start: 2024-09-28 | End: 2024-09-28

## 2024-09-28 RX ORDER — METHYLPREDNISOLONE SODIUM SUCCINATE 40 MG/ML
40 INJECTION, POWDER, LYOPHILIZED, FOR SOLUTION INTRAMUSCULAR; INTRAVENOUS EVERY 6 HOURS
Status: DISCONTINUED | OUTPATIENT
Start: 2024-09-28 | End: 2024-10-02

## 2024-09-28 RX ORDER — AZITHROMYCIN 250 MG/1
500 TABLET, FILM COATED ORAL
Status: COMPLETED | OUTPATIENT
Start: 2024-09-28 | End: 2024-09-29

## 2024-09-28 RX ORDER — METHYLPREDNISOLONE SODIUM SUCCINATE 40 MG/ML
40 INJECTION, POWDER, LYOPHILIZED, FOR SOLUTION INTRAMUSCULAR; INTRAVENOUS EVERY 12 HOURS
Status: DISCONTINUED | OUTPATIENT
Start: 2024-09-28 | End: 2024-09-28

## 2024-09-28 NOTE — PROGRESS NOTES
NURSING ADMISSION NOTE      Patient admitted via Cart  Oriented to room.  Safety precautions initiated.  Bed in low position.  Call light in reach.    Pt. A&O x4. Pt. On 6-7L O2; sats in the low 90's. Admission navigator completed. Pt. Resting comfortably at this time. Respiratory called for nebs.

## 2024-09-28 NOTE — PLAN OF CARE
Problem: Patient/Family Goals  Goal: Patient/Family Long Term Goal  Description: Patient's Long Term Goal: Discharge with adequate resources    Interventions:  - See additional Care Plan goals for specific interventions  Outcome: Progressing  Goal: Patient/Family Short Term Goal  Description: Patient's Short Term Goal:   9/28 AM: Wean O2 as tolerated    Interventions:   - See additional Care Plan goals for specific interventions  Outcome: Progressing     Problem: CARDIOVASCULAR - ADULT  Goal: Maintains optimal cardiac output and hemodynamic stability  Description: INTERVENTIONS:  - Monitor vital signs, rhythm, and trends  - Monitor for bleeding, hypotension and signs of decreased cardiac output  - Evaluate effectiveness of vasoactive medications to optimize hemodynamic stability  - Monitor arterial and/or venous puncture sites for bleeding and/or hematoma  - Assess quality of pulses, skin color and temperature  - Assess for signs of decreased coronary artery perfusion - ex. Angina  - Evaluate fluid balance, assess for edema, trend weights  Outcome: Progressing     Problem: RESPIRATORY - ADULT  Goal: Achieves optimal ventilation and oxygenation  Description: INTERVENTIONS:  - Assess for changes in respiratory status  - Assess for changes in mentation and behavior  - Position to facilitate oxygenation and minimize respiratory effort  - Oxygen supplementation based on oxygen saturation or ABGs  - Provide Smoking Cessation handout, if applicable  - Encourage broncho-pulmonary hygiene including cough, deep breathe, Incentive Spirometry  - Assess the need for suctioning and perform as needed  - Assess and instruct to report SOB or any respiratory difficulty  - Respiratory Therapy support as indicated  - Manage/alleviate anxiety  - Monitor for signs/symptoms of CO2 retention  Outcome: Progressing

## 2024-09-28 NOTE — PLAN OF CARE
Assumed care @ 0730. Patient with non-productive cough,respirations 24, slightly labored, crackles noted on bilateral lung base,O2 sat 88-90% on O2 7 liters high flow nasal cannula, o2 increased to 10 liters high flow nasal cannula, O2 sat 90-92%, Dr. Stewart notified, new order received. ABG done, result relayed by RT to Dr. Temple, O2 increased to 15 liters high flow nasal cannula, O2 sat 93-94%. Temp 97.7. 1245- Patient examined by Dr. Temple, Ct scan of the Chest done. Patient transferred to room 505, report given to Judith. REspirations 24/min, O2 sat 96% on O2 15 liters high flow nasal cannula.

## 2024-09-28 NOTE — CONSULTS
[facility] Pharmacy Dosing Service      Initial Pharmacokinetic Consult for Vancomycin Dosing     Ben Lanza is a 39 year old male who is being initiated on vancomycin therapy for pneumonia.  Pharmacy has been asked to dose vancomycin by Dr. Temple.  The initial treatment and monitoring approach will be steady state AUC strategy.        Weight and Temperature:    Wt Readings from Last 1 Encounters:   24 104.3 kg (229 lb 15 oz)        Temp Readings from Last 1 Encounters:   24 97.7 °F (36.5 °C) (Oral)      Labs:   Recent Labs   Lab 24  1149   CREATSERUM 0.90      Estimated Creatinine Clearance: 115 mL/min (based on SCr of 0.9 mg/dL).     Recent Labs   Lab 24  1149   WBC 9.3          The Pharmacokinetic Target is:     to 600 mg-h/L and trough <=15 mg/L    Renal Dosing Considerations:    None     Assessment/Plan:   Initial/Loading dose: Will receive 1750 mg IV (15 mg/kg, capped at 2250 mg) x 1 initial dose.      Maintenance dose: Pharmacy will dose vancomycin at 1750 mg IV every 12 hours    Monitorin) Plan for vancomycin peak and trough to be obtained in approximately 72 hours    2) Pharmacy will order SCr as clinically indicated to assess renal function.    3) Pharmacy will monitor for toxicity and efficacy, adjust vancomycin dose and/or frequency, and order vancomycin levels as appropriate per the Pharmacy and Therapeutics Committee approved protocol until discontinuation of the medication.       We appreciate the opportunity to assist in the care of this patient.     Ned Navarro, PharmD  2024  11:44 AM  [facility] Extension: [phone]

## 2024-09-28 NOTE — PLAN OF CARE
Problem: RESPIRATORY - ADULT  Goal: Achieves optimal ventilation and oxygenation  Description: INTERVENTIONS:  - Assess for changes in respiratory status  - Assess for changes in mentation and behavior  - Position to facilitate oxygenation and minimize respiratory effort  - Oxygen supplementation based on oxygen saturation or ABGs  - Provide Smoking Cessation handout, if applicable  - Encourage broncho-pulmonary hygiene including cough, deep breathe, Incentive Spirometry  - Assess the need for suctioning and perform as needed  - Assess and instruct to report SOB or any respiratory difficulty  - Respiratory Therapy support as indicated  - Manage/alleviate anxiety  - Monitor for signs/symptoms of CO2 retention  Outcome: Progressing   Remain on 7L High flow nasal cannula, saturation between 90-93% at rest, will drop to 88% with exertion but overall patient stated feeling better from admission, cough is better, still with fine crackles., using incentive spirometer. On IV abt , steroid and scheduled nebs for pneumonia. POC cont.  Afebrile. Denies pain. Resting comfortably.

## 2024-09-28 NOTE — PROGRESS NOTES
Aox4. Glasses. Currently on 8L O2, weaning as tolerated. No tele. lovenox. Regular diet. Standby assist. Abx regimen. MD Temple aware of improving O2 requirements.

## 2024-09-28 NOTE — CONSULTS
[facility]  Report of Consultation    Ben Lanza Patient Status:  Inpatient    1985 MRN [id number]   [facility] 4NW-A Attending Gabriele Stewart MD   Hosp Day # 1 PCP Larry Pisano MD     Reason for Consultation:  hypoxia    History of Present Illness:  Ben Lanza is a a(n) 39 year old male former smoker (quit 2017) with PMH asthma who was admitted with worsening dyspnea, cough and hypoxia. He explains he had been well until about two weeks ago he developed URI symptoms with sinus congestion/drainage.  His daughter and wife had previously been ill with the flu - he was not ill at the time. He developed cough, congestion and dyspnea and was seen by teleheatlh, treated for influenza with tamiflu with eventual resolution of the URI symptoms but persisted with cough and dyspnea. He had a visit with his PCP earlier this week and started on treatment for pneumonia with augmentin with little change. He had follow up office visit yesterday and noted hypoxic leading to his ER visit.    He does report wheezing during this time as well. He was started on treatment for pneumonia and asthma exacerbation, however today was noted to have worsening hypoxia with escalation of supplemental o2 from 6L to 15L.   He feels the same presently - continues with minimally productive cough, dyspnea and chest tightness - all worse when laying supine.   +fevers , chills and night sweats for the past two weeks.  No chest pain/pressure. No BLE edema. No rash or skin lesions.  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 No pertinent surgical history.  Family History   Problem Relation Age of Onset    Hypertension Father     Anxiety Brother       reports that he has quit smoking. His smoking use included  cigarettes. He has never used smokeless tobacco. He reports current alcohol use of about 12.0 standard drinks of alcohol per week. He reports that he does not currently use drugs.    Allergies:  No Known Allergies    Medications:  reviewed   azithromycin  500 mg Oral Daily    methylPREDNISolone  40 mg Intravenous Q12H    enoxaparin  40 mg Subcutaneous Daily    cefTRIAXone  1 g Intravenous Q24H    guaiFENesin ER  1,200 mg Oral BID    benzonatate  200 mg Oral TID    albuterol  2.5 mg Nebulization Q4H WA (5 times daily)    budesonide  0.5 mg Nebulization 2 times daily       acetaminophen    melatonin    glycerin-hypromellose-    sodium chloride    ondansetron    prochlorperazine    polyethylene glycol (PEG 3350)    sennosides    bisacodyl    fleet enema    HYDROcodone-homatropine    Review of Systems:   Constitutional: see HPI  Eyes: Negative for visual disturbance, irritation and redness.  Ears, nose, mouth, throat, and face: Negative for hearing loss, tinnitus, nasal congestion, snoring, sore throat, hoarseness and voice change.  Respiratory: see HPI  Cardiovascular: Negative for chest pain, palpitations, irregular heart beats, syncope, fatigue, orthopnea, paroxysmal nocturnal dyspnea, lower extremity edema.  Gastrointestinal: Negative for dysphagia, odynophagia, reflux symptoms, nausea, vomiting, change in bowel habits, diarrhea, constipation and abdominal pain.  Integument/breast: Negative for rash, skin lesions, and pruritus.  Hematologic/lymphatic: Negative for easy bruising, bleeding, and lymphadenopathy.  Musculoskeletal: Negative for myalgias, arthralgias, muscle weakness.  Neurological: Negative for headaches, dizziness, seizures, memory problems, trouble swallowing, speech problems, gait problems and weakness.  : Denies dysuria, hematuria, urinary retention.  Behavioral/Psych: Normal affect, mood, speech. No AVH, No SI/HI    All other review of systems are negative.    Vital signs in last 24  hours:  Patient Vitals for the past 24 hrs:   BP Temp Temp src Pulse Resp SpO2 Height Weight   09/28/24 1025 140/79 98.1 °F (36.7 °C) Oral 98 18 93 % -- --   09/28/24 1013 -- -- -- 106 -- 90 % -- --   09/28/24 1012 -- -- -- 107 24 90 % -- --   09/28/24 1008 -- -- -- 106 -- 90 % -- --   09/28/24 0814 -- -- -- 96 24 92 % -- --   09/28/24 0809 -- -- -- -- -- (!) 88 % -- --   09/28/24 0718 114/75 97.7 °F (36.5 °C) Oral 91 19 -- -- --   09/28/24 0535 -- -- -- -- 18 -- -- --   09/28/24 0349 131/85 97.9 °F (36.6 °C) Oral 93 -- -- -- --   09/28/24 0300 -- -- -- -- -- 92 % -- --   09/27/24 2305 -- -- -- -- -- 91 % -- --   09/27/24 2034 (!) 150/93 98.7 °F (37.1 °C) Oral 100 -- 90 % -- --   09/27/24 1613 -- -- -- 88 18 91 % -- --   09/27/24 1431 134/89 97.6 °F (36.4 °C) Oral 80 -- 91 % -- 229 lb 15 oz (104.3 kg)   09/27/24 1330 149/90 -- -- 98 21 91 % -- --   09/27/24 1215 (!) 146/99 -- -- 88 (!) 27 92 % -- --   09/27/24 1126 (!) 158/98 98.2 °F (36.8 °C) Temporal 87 24 (!) 82 % 5' 11\" (1.803 m) 230 lb (104.3 kg)       Intake/Output:    Intake/Output Summary (Last 24 hours) at 9/28/2024 1109  Last data filed at 9/28/2024 0537  Gross per 24 hour   Intake 250 ml   Output 300 ml   Net -50 ml          PHYSICAL EXAM  GEN: Appears alert, comfortable. No acute distress  PSYCH: Normal mood and affect, AAOX3  NEURO: CN 2-12 grossly intact, no focal deficits  HEENT: Atraumatic, normocephalic, EOMI, no icterus/hemorrhage, no conjunctival injection/discharge, nares normal  MOUTH: MMM, good dentition  NECK: Trachea midline, symmetric, no visible masses or scars, no crepitus, normal flexion/extension  CHEST: Normal chest excursion, no visible deformity or scars, no tenderness to palpation  PULMONARY:crackles posteriorly. CTAB anteriorly. No wheeze heard. No distress on 15L  COR: RRR no m  GI: NABS X 4, S/NT/ND, No hernias or HSM  LYMPHATIC: No palpable or visible lymphadenopathy in neck, axillae, groin  MSK: Normal strength and sensation in  all extremities  EXT: No overt deformities, No C/C/E, 2+ DP/PT pulses b/l   SKIN: No rashes, ulcers, nodules    Lab Data Review:  Recent Labs   Lab 09/27/24  1149   *   BUN 8*   CREATSERUM 0.90   CA 9.6      K 3.7      CO2 25.0     Recent Labs   Lab 09/27/24  1149   RBC 5.33   HGB 15.7   HCT 46.1   MCV 86.5   MCH 29.5   MCHC 34.1   RDW 12.7   NEPRELIM 6.85   WBC 9.3   .0     No results for input(s): \"BNP\" in the last 168 hours.  No results for input(s): \"TROP\", \"CK\" in the last 168 hours.  No results for input(s): \"PT\", \"INR\", \"PTT\" in the last 168 hours.    Other Labs:     ABG:  Recent Labs   Lab 09/28/24  1054   ABGPHT 7.50*   WELCZM6K 32*   MTQTG2Y 62*   ABGHCO3 26.7   ABGBE 2.5*   TEMP 98.6   TERRELL Positive   SITE Left Radial   DEV High flow nasal cannula   THGB 15.4       Cultures:   No results found for this visit on 09/27/24.  No results for input(s): \"COLORUR\", \"CLARITY\", \"SPECGRAVITY\", \"GLUUR\", \"BILUR\", \"KETUR\", \"BLOODURINE\", \"PHURINE\", \"PROUR\", \"UROBILINOGEN\", \"NITRITE\", \"LEUUR\", \"WBCUR\", \"RBCUR\", \"BACUR\", \"EPIUR\" in the last 168 hours.    Radiology:   Reviewed personally  XR CHEST AP PORTABLE  (CPT=71045)    Result Date: 9/27/2024  CONCLUSION:  Slight worsening of patchy ground-glass opacities within the mid and lower lung could be due to worsening infectious or inflammatory process.   LOCATION:  [facility]      Dictated by (CST): Franklyn Emerson MD on 9/27/2024 at 12:24 PM     Finalized by (CST): Franklyn Emerson MD on 9/27/2024 at 12:28 PM        ASSESSMENT  Acute hypoxic respiratory failure due to pneumonia, bacterial superinfection, with recent viral URI  Bilateral opacities on chest imaging with lower lobe consolidation - likely pneumonia, possibly atypical infection. With reported recent influenza infection, at risk for strep.  Also would consider inflammatory/autoimmune such as eosinophilic pneumonia.   Asthma exacerbation related to above  Hx tobacco abuse - quit  2017    PLAN  Continue close monitoring of respiratory status, wean o2 for sats >89%. Given his worsening hypoxia, likely will need to escalate to vapotherm.   Continue empiric abx, azith/ceftriaxone day 2; will add vancomycin IV. send sputum if able; blood cx pending  Continue scheduled nebs - albuterol, budesonide  Will increase methylpred frequency  Check procalcitonin, proBNP, fungitell, fungal ab survey, quantiferon  Check urine ag for strep and histo  Obtain CTA chest  Follow fluid balance, lytes, urine output  PPX: LMWH  Dispo: floor - tx to PMU to allow access to vapotherm. Low threshold for transfer to ICU    Thank you for the consultation.  Will follow with you.    Chang Temple MD

## 2024-09-28 NOTE — PROGRESS NOTES
[facility]   part of [facility]     Hospitalist Progress Note     Ben Lanza Patient Status:  Inpatient    1985 MRN [id number]   Location [facility] 5NW-A Attending Gabriele Stewart MD   Hosp Day # 1 PCP Larry Pisano MD     Chief Complaint: dyspea/cough    Subjective:     Patient feels ok though has had increasing O2 needs    Objective:    Review of Systems:   ROS completed; pertinent positive and negatives stated in subjective.    Vital signs:  Temp:  [97.6 °F (36.4 °C)-98.7 °F (37.1 °C)] 98 °F (36.7 °C)  Pulse:  [] 96  Resp:  [18-24] 22  BP: (114-150)/(75-93) 129/76  SpO2:  [88 %-94 %] 94 %    Physical Exam:    General: No acute distress  Respiratory: dec AE with ronchi  Cardiovascular: S1, S2.  Abdomen: Soft  Neuro: No new focal deficits      Diagnostic Data:    Labs:  Recent Labs   Lab 24  1149   WBC 9.3   HGB 15.7   MCV 86.5   .0       Recent Labs   Lab 24  1149   *   BUN 8*   CREATSERUM 0.90   CA 9.6   ALB 4.5      K 3.7      CO2 25.0   ALKPHO 87   AST 30   ALT 31   BILT 0.4   TP 7.8       Estimated Creatinine Clearance: 115 mL/min (based on SCr of 0.9 mg/dL).     No results for input(s): \"TROP\", \"TROPHS\", \"CK\" in the last 168 hours.    No results for input(s): \"PTP\", \"INR\" in the last 168 hours.           Imaging: Imaging data reviewed in Epic.    Medications:    azithromycin  500 mg Oral Daily    vancomycin  1,750 mg Intravenous Q12H    methylPREDNISolone  40 mg Intravenous Q6H    enoxaparin  40 mg Subcutaneous Daily    cefTRIAXone  1 g Intravenous Q24H    guaiFENesin ER  1,200 mg Oral BID    benzonatate  200 mg Oral TID    albuterol  2.5 mg Nebulization Q4H WA (5 times daily)    budesonide  0.5 mg Nebulization 2 times daily       Assessment & Plan:     #Acute Hypoxic resp failure  On vapotherm, wean as able  CTA  independently reviewed:  b/l PNA, no PE    #PNA  Cont. Rocrphine/azithro  MRSA nares pending  Vanco added  today  RVP neg  COVID neg  Legionella neg    #Bronchospasms  Cont. Steroids  BD protocol  Budesonide nebs      Dispo: as above. Started on vapotherm.  Discussed with pulm      Gabriele Stewart MD    Supplementary Documentation:   P(1) + D*C1+C2+C3)  Quality:    DVT Mechanical Prophylaxis:     Early ambuation  DVT Pharmacologic Prophylaxis   Medication    enoxaparin (Lovenox) 40 MG/0.4ML SUBQ injection 40 mg                Code Status: Not on file  Owens: No urinary catheter in place  Owens Duration (in days):   Central line:    ELDER:       Discharge is dependent on: clinical stability  At this point Mr. Lanza is expected to be discharge to: home    The 21st Century Cures Act makes medical notes like these available to patients in the interest of transparency. Please be advised this is a medical document. Medical documents are intended to carry relevant information, facts as evident, and the clinical opinion of the practitioner. The medical note is intended as peer to peer communication and may appear blunt or direct. It is written in medical language and may contain abbreviations or verbiage that are unfamiliar.

## 2024-09-29 PROBLEM — Z51.81 THERAPEUTIC DRUG MONITORING: Status: ACTIVE | Noted: 2024-09-29

## 2024-09-29 PROCEDURE — 99233 SBSQ HOSP IP/OBS HIGH 50: CPT | Performed by: INTERNAL MEDICINE

## 2024-09-29 PROCEDURE — 99233 SBSQ HOSP IP/OBS HIGH 50: CPT | Performed by: HOSPITALIST

## 2024-09-29 NOTE — PLAN OF CARE
Pt Aox4. Spo2 >90% on 8L . RA baseline. IV steroids, Nebs. No tele, Lovenox. Continent, up SB d/t O2 needs. IV rocephin, vanco, PO zithromax. Call light within reach, safety precautions in place. POC continues.     Problem: Patient/Family Goals  Goal: Patient/Family Long Term Goal  Description: Patient's Long Term Goal: Discharge with adequate resources    Interventions:  - See additional Care Plan goals for specific interventions  Outcome: Progressing  Goal: Patient/Family Short Term Goal  Description: Patient's Short Term Goal:   9/28 AM: Wean O2 as tolerated  9/28noc: wean O2    Interventions:   - See additional Care Plan goals for specific interventions  Outcome: Progressing     Problem: CARDIOVASCULAR - ADULT  Goal: Maintains optimal cardiac output and hemodynamic stability  Description: INTERVENTIONS:  - Monitor vital signs, rhythm, and trends  - Monitor for bleeding, hypotension and signs of decreased cardiac output  - Evaluate effectiveness of vasoactive medications to optimize hemodynamic stability  - Monitor arterial and/or venous puncture sites for bleeding and/or hematoma  - Assess quality of pulses, skin color and temperature  - Assess for signs of decreased coronary artery perfusion - ex. Angina  - Evaluate fluid balance, assess for edema, trend weights  Outcome: Progressing     Problem: RESPIRATORY - ADULT  Goal: Achieves optimal ventilation and oxygenation  Description: INTERVENTIONS:  - Assess for changes in respiratory status  - Assess for changes in mentation and behavior  - Position to facilitate oxygenation and minimize respiratory effort  - Oxygen supplementation based on oxygen saturation or ABGs  - Provide Smoking Cessation handout, if applicable  - Encourage broncho-pulmonary hygiene including cough, deep breathe, Incentive Spirometry  - Assess the need for suctioning and perform as needed  - Assess and instruct to report SOB or any respiratory difficulty  - Respiratory Therapy support as  indicated  - Manage/alleviate anxiety  - Monitor for signs/symptoms of CO2 retention  Outcome: Progressing

## 2024-09-29 NOTE — PROGRESS NOTES
[facility] Progress Note     SUBJECTIVE/Interval history:  No acute events overnight, he feels better today with less cough/dyspnea. O2 weaned to 5-6L. No fevers    Review of Systems:   A comprehensive 14 point review of systems was completed.   Pertinent positives and negatives noted in the HPI.    Medications  Reviewed personally   azithromycin  500 mg Oral Daily    vancomycin  1,750 mg Intravenous Q12H    methylPREDNISolone  40 mg Intravenous Q6H    albuterol  2.5 mg Nebulization QID    enoxaparin  40 mg Subcutaneous Daily    cefTRIAXone  1 g Intravenous Q24H    guaiFENesin ER  1,200 mg Oral BID    benzonatate  200 mg Oral TID    budesonide  0.5 mg Nebulization 2 times daily       acetaminophen    melatonin    glycerin-hypromellose-    sodium chloride    ondansetron    prochlorperazine    polyethylene glycol (PEG 3350)    sennosides    bisacodyl    fleet enema    HYDROcodone-homatropine    OBJECTIVE:  Vitals:    09/28/24 1320 09/28/24 1421 09/28/24 2012 09/29/24 0522   BP: 129/76  128/85 121/78   BP Location: Left arm  Left arm Left arm   Pulse: 96  94 83   Resp: 22  20 20   Temp: 98 °F (36.7 °C)  98 °F (36.7 °C) 98 °F (36.7 °C)   TempSrc: Oral  Oral Oral   SpO2: 94% 93% 93% 94%   Weight:       Height:           Vital signs in last 24 hours:  Blood pressure 121/78, pulse 83, temperature 98 °F (36.7 °C), temperature source Oral, resp. rate 20, height 5' 11\" (1.803 m), weight 229 lb 15 oz (104.3 kg), SpO2 94%.     Intake/Output:  I/O last 3 completed shifts:  In: 490 [P.O.:490]  Out: 700 [Urine:700]       Physical Exam:  General: Appears alert, comfortable. No acute distress  Neurologic:No focal deficits.  Alert.  Oriented.  Lungs:improved lung aeration bilaterally. crackles posteriorly. No wheeze. No distress  Heart:RRR no m  Abdomen:Soft, non-tender.  No masses.  No guarding.  No rebound.  Extremities:no edema    Lab Data Review:   Recent Labs   Lab  09/27/24  1149   *   BUN 8*   CREATSERUM 0.90   CA 9.6      K 3.7      CO2 25.0     Recent Labs   Lab 09/27/24  1149   RBC 5.33   HGB 15.7   HCT 46.1   MCV 86.5   MCH 29.5   MCHC 34.1   RDW 12.7   NEPRELIM 6.85   WBC 9.3   .0     No results for input(s): \"BNP\" in the last 168 hours.  No results for input(s): \"TROP\", \"CK\" in the last 168 hours.  No results for input(s): \"PT\", \"INR\", \"PTT\" in the last 168 hours.  Recent Labs   Lab 09/28/24  1054   ABGPHT 7.50*   JLEJHV1S 32*   DYKWJ7F 62*   ABGHCO3 26.7   ABGBE 2.5*   TEMP 98.6   TERRELL Positive   SITE Left Radial   DEV High flow nasal cannula   THGB 15.4       Other Labs:  Interval Culture Data:   Hospital Encounter on 09/27/24   1. Blood Culture     Status: None (Preliminary result)    Collection Time: 09/27/24 11:49 AM    Specimen: Blood,peripheral   Result Value Ref Range    Blood Culture Result No Growth 1 Day N/A     No results for input(s): \"COLORUR\", \"CLARITY\", \"SPECGRAVITY\", \"GLUUR\", \"BILUR\", \"KETUR\", \"BLOODURINE\", \"PHURINE\", \"PROUR\", \"UROBILINOGEN\", \"NITRITE\", \"LEUUR\", \"WBCUR\", \"RBCUR\", \"BACUR\", \"EPIUR\" in the last 168 hours.    Interval Radiology:   Reviewed personally  CTA CHEST (CPT=71275)    Result Date: 9/28/2024  CONCLUSION:  1. There is multifocal consolidation in lower lobes bilaterally and lingula which most likely indicates multifocal pneumonia. 2. In the aerated portions of the lungs there is mixed mosaic type attenuation which is more likely related air trapping although could represent an early sign of edema. 3. There is no pulmonary embolism.     LOCATION:  [facility]   Dictated by (CST): Jake Novoa MD on 9/28/2024 at 1:01 PM     Finalized by (CST): Jake Novoa MD on 9/28/2024 at 1:03 PM       XR CHEST AP PORTABLE  (CPT=71045)    Result Date: 9/27/2024  CONCLUSION:  Slight worsening of patchy ground-glass opacities within the mid and lower lung could be due to worsening infectious or inflammatory process.   LOCATION:   [facility]      Dictated by (CST): Franklyn Emerson MD on 9/27/2024 at 12:24 PM     Finalized by (CST): Franklyn Emerson MD on 9/27/2024 at 12:28 PM        ASSESSMENT  Acute hypoxic respiratory failure suspect post viral pneumonitis vs atypical infection. Labs not suggestive of bacterial infection  Bilateral opacities on chest imaging with lower lobe consolidation - atelectasis and/or possibly atypical infection. With reported recent influenza infection, at risk for strep. Also would consider inflammatory/autoimmune such as eosinophilic pneumonia.   Asthma exacerbation related to above  Hx tobacco abuse - quit 2017     PLAN  Continue close monitoring of respiratory status, wean o2 for sats >89%.    Continue empiric abx, azith/ceftriaxone day 3; will stop vanc today  Continue scheduled nebs - albuterol, budesonide  Continue on methylpred  Fungal testing pending  Follow fluid balance, lytes, urine output  PPX: LMWH  Dispo: floor     [facility] Temple MD

## 2024-09-29 NOTE — PROGRESS NOTES
[facility]   part of [facility]     Hospitalist Progress Note     Ben Lanza Patient Status:  Inpatient    1985 MRN [id number]   Location [facility] 5NW-A Attending Gabriele Stewart MD   Hosp Day # 2 PCP Larry Pisano MD     Chief Complaint: dyspea/cough    Subjective:     Patient feels better today.  Remains on O2    Objective:    Review of Systems:   ROS completed; pertinent positive and negatives stated in subjective.    Vital signs:  Temp:  [98 °F (36.7 °C)] 98 °F (36.7 °C)  Pulse:  [] 83  Resp:  [20-22] 20  BP: (121-129)/(76-85) 121/78  SpO2:  [93 %-94 %] 94 %    Physical Exam:    General: No acute distress  Respiratory: b/l rhonchi  Cardiovascular: S1, S2.  Abdomen: Soft  Neuro: No new focal deficits      Diagnostic Data:    Labs:  Recent Labs   Lab 24  1149   WBC 9.3   HGB 15.7   MCV 86.5   .0       Recent Labs   Lab 24  1149   *   BUN 8*   CREATSERUM 0.90   CA 9.6   ALB 4.5      K 3.7      CO2 25.0   ALKPHO 87   AST 30   ALT 31   BILT 0.4   TP 7.8       Estimated Creatinine Clearance: 115 mL/min (based on SCr of 0.9 mg/dL).     No results for input(s): \"TROP\", \"TROPHS\", \"CK\" in the last 168 hours.    No results for input(s): \"PTP\", \"INR\" in the last 168 hours.           Imaging: Imaging data reviewed in Epic.    Medications:    azithromycin  500 mg Oral Daily    vancomycin  1,750 mg Intravenous Q12H    methylPREDNISolone  40 mg Intravenous Q6H    albuterol  2.5 mg Nebulization QID    enoxaparin  40 mg Subcutaneous Daily    cefTRIAXone  1 g Intravenous Q24H    guaiFENesin ER  1,200 mg Oral BID    benzonatate  200 mg Oral TID    budesonide  0.5 mg Nebulization 2 times daily       Assessment & Plan:     #Acute Hypoxic resp failure  Cont. O2 and wean as able  CTA:  b/l PNA, no PE    #PNA  Cont. rocephin/azithro/vanco  MRSA nares neg  RVP neg  COVID neg  Legionella neg  SCX: neg  PCT: 0.1  Will f/u with pulm and have repeat imaging to  r/o other underlying pathology    #Bronchospasms  Cont. Steroids  BD protocol  Budesonide nebs      Dispo: as above. Discussed with pulm. Remains hypoxic.  ? Abld to DC vanco with neg MRSA nares.        Gabriele Stewart MD    Supplementary Documentation:   P(1) + R(vanco)  Quality:    DVT Mechanical Prophylaxis:     Early ambuation  DVT Pharmacologic Prophylaxis   Medication    enoxaparin (Lovenox) 40 MG/0.4ML SUBQ injection 40 mg                Code Status: Not on file  Owens: No urinary catheter in place  Owens Duration (in days):   Central line:    ELDER:       Discharge is dependent on: clinical stability  At this point Mr. Lanza is expected to be discharge to: home    The 21st Century Cures Act makes medical notes like these available to patients in the interest of transparency. Please be advised this is a medical document. Medical documents are intended to carry relevant information, facts as evident, and the clinical opinion of the practitioner. The medical note is intended as peer to peer communication and may appear blunt or direct. It is written in medical language and may contain abbreviations or verbiage that are unfamiliar.

## 2024-09-29 NOTE — PLAN OF CARE
Problem: Patient/Family Goals  Goal: Patient/Family Long Term Goal  Description: Patient's Long Term Goal: Discharge with adequate resources    Interventions:  - See additional Care Plan goals for specific interventions  9/29/2024 1045 by Yolanda Hanson RN  Outcome: Progressing  9/29/2024 1045 by Yolanda Hanson RN  Outcome: Progressing  Goal: Patient/Family Short Term Goal  Description: Patient's Short Term Goal:   9/28 AM: Wean O2 as tolerated  9/28noc: wean O2  9/29 AM: Wean O2 as tolerated    Interventions:   - See additional Care Plan goals for specific interventions  9/29/2024 1045 by Yolanda Hanson RN  Outcome: Progressing  9/29/2024 1045 by Yolanda Hanson RN  Outcome: Progressing     Problem: CARDIOVASCULAR - ADULT  Goal: Maintains optimal cardiac output and hemodynamic stability  Description: INTERVENTIONS:  - Monitor vital signs, rhythm, and trends  - Monitor for bleeding, hypotension and signs of decreased cardiac output  - Evaluate effectiveness of vasoactive medications to optimize hemodynamic stability  - Monitor arterial and/or venous puncture sites for bleeding and/or hematoma  - Assess quality of pulses, skin color and temperature  - Assess for signs of decreased coronary artery perfusion - ex. Angina  - Evaluate fluid balance, assess for edema, trend weights  9/29/2024 1045 by Yolanda Hanson RN  Outcome: Progressing  9/29/2024 1045 by Yolanda Hanson RN  Outcome: Progressing     Problem: RESPIRATORY - ADULT  Goal: Achieves optimal ventilation and oxygenation  Description: INTERVENTIONS:  - Assess for changes in respiratory status  - Assess for changes in mentation and behavior  - Position to facilitate oxygenation and minimize respiratory effort  - Oxygen supplementation based on oxygen saturation or ABGs  - Provide Smoking Cessation handout, if applicable  - Encourage broncho-pulmonary hygiene including cough, deep breathe, Incentive Spirometry  - Assess the need for suctioning and  perform as needed  - Assess and instruct to report SOB or any respiratory difficulty  - Respiratory Therapy support as indicated  - Manage/alleviate anxiety  - Monitor for signs/symptoms of CO2 retention  9/29/2024 1045 by Yolanda Hanson, RN  Outcome: Progressing  9/29/2024 1045 by Yolanda Hanson, RN  Outcome: Progressing

## 2024-09-29 NOTE — PROGRESS NOTES
Aox4. Glasses. Currently 6-8L O2. O2 walk in hallway needed 8L O2. Trying to wean as tolerated. No tele. . Lovenox. Regular diet. Standby assist. Abx regimen.

## 2024-09-29 NOTE — PLAN OF CARE
Oxygen Walk results:      SPO2% on Room Air at Rest: 86 (09/29/24 1500)  SPO2% on Oxygen at Rest: 94 (09/29/24 1500)  At rest oxygen flow (liters per minute): 5 (09/29/24 1500)     SPO2% Ambulation on Oxygen: 90 (09/29/24 1500)  Ambulation oxygen flow (liters per minute): 8 (09/29/24 1500).

## 2024-09-30 ENCOUNTER — APPOINTMENT (OUTPATIENT)
Dept: CV DIAGNOSTICS | Facility: HOSPITAL | Age: 39
End: 2024-09-30
Attending: HOSPITALIST
Payer: COMMERCIAL

## 2024-09-30 LAB
ANION GAP SERPL CALC-SCNC: 9 MMOL/L (ref 0–18)
BUN BLD-MCNC: 15 MG/DL (ref 9–23)
CALCIUM BLD-MCNC: 9.3 MG/DL (ref 8.7–10.4)
CHLORIDE SERPL-SCNC: 105 MMOL/L (ref 98–112)
CO2 SERPL-SCNC: 23 MMOL/L (ref 21–32)
CREAT BLD-MCNC: 0.97 MG/DL
EGFRCR SERPLBLD CKD-EPI 2021: 101 ML/MIN/1.73M2 (ref 60–?)
GLUCOSE BLD-MCNC: 218 MG/DL (ref 70–99)
MAGNESIUM SERPL-MCNC: 2.2 MG/DL (ref 1.6–2.6)
OSMOLALITY SERPL CALC.SUM OF ELEC: 291 MOSM/KG (ref 275–295)
PHOSPHATE SERPL-MCNC: 3.7 MG/DL (ref 2.4–5.1)
POTASSIUM SERPL-SCNC: 4.1 MMOL/L (ref 3.5–5.1)
SODIUM SERPL-SCNC: 137 MMOL/L (ref 136–145)

## 2024-09-30 PROCEDURE — 99233 SBSQ HOSP IP/OBS HIGH 50: CPT | Performed by: HOSPITALIST

## 2024-09-30 PROCEDURE — 99233 SBSQ HOSP IP/OBS HIGH 50: CPT | Performed by: INTERNAL MEDICINE

## 2024-09-30 RX ORDER — FUROSEMIDE 10 MG/ML
20 INJECTION INTRAMUSCULAR; INTRAVENOUS ONCE
Status: COMPLETED | OUTPATIENT
Start: 2024-09-30 | End: 2024-09-30

## 2024-09-30 RX ORDER — TEMAZEPAM 7.5 MG/1
15 CAPSULE ORAL NIGHTLY PRN
Status: DISCONTINUED | OUTPATIENT
Start: 2024-09-30 | End: 2024-10-05

## 2024-09-30 NOTE — PAYOR COMM NOTE
ADMISSION REVIEW     Payor: [employer]  Subscriber #:  [id number]  Authorization Number: [id number]    Admit date: 9/27/24  Admit time:  2:15 PM       REVIEW DOCUMENTATION:     ED Provider Notes        ED Provider Notes signed by Asha Tellez MD at 9/27/2024  4:11 PM       Author: Asha Tellez MD Service: -- Author Type: Physician    Filed: 9/27/2024  4:11 PM Date of Service: 9/27/2024  4:08 PM Status: Signed    : Asha Tellez MD (Physician)           Patient Seen in: [facility] 4nw-a      History     Chief Complaint   Patient presents with    Difficulty Breathing     Stated Complaint: sob    Subjective:   HPI    Patient for worsening shortness of breath.  Probably as well family had an upper respiratory infection a couple weeks ago, wife had tested positive for the flu in fact.  As they were getting better he developed the symptoms and they have been progressive.  No chest pain no fevers but persisting cough and worsening shortness of breath.  Was diagnosed with pneumonia few days ago started on Augmentin, due to progressive symptoms and hypoxia was sent here.      Smoked for 20 years but quit 7 years ago.    Objective:   Past Medical History:    Childhood asthma (HCC)              History reviewed. No pertinent surgical history.             Social History     Socioeconomic History    Marital status: Single   Tobacco Use    Smoking status: Former     Current packs/day: 0.50     Types: Cigarettes    Smokeless tobacco: Never   Vaping Use    Vaping status: Never Used   Substance and Sexual Activity    Alcohol use: Yes     Alcohol/week: 12.0 standard drinks of alcohol     Types: 12 Cans of beer per week     Comment: socially    Drug use: Not Currently   Other Topics Concern    Caffeine Concern No    Stress Concern No     Social Determinants of Health     Financial Resource Strain: Not At Risk (10/15/2022)    Received from [facility]    Financial Resource Strain     How hard is  it for you to pay for the very basics like food, housing, medical care, and heating?: Not hard at all   Food Insecurity: No Food Insecurity (9/27/2024)    Food Insecurity     Food Insecurity: Never true   Transportation Needs: No Transportation Needs (9/27/2024)    Transportation Needs     Lack of Transportation: No   Housing Stability: Low Risk  (9/27/2024)    Housing Stability     Housing Instability: No              Review of Systems    Positive for stated Chief Complaint: Difficulty Breathing    Other systems are as noted in HPI.  Constitutional and vital signs reviewed.      All other systems reviewed and negative except as noted above.    Physical Exam     ED Triage Vitals [09/27/24 1126]   BP (!) 158/98   Pulse 87   Resp 24   Temp 98.2 °F (36.8 °C)   Temp src Temporal   SpO2 (!) 82 %   O2 Device None (Room air)       Current Vitals:   Vital Signs  BP: 134/89  Pulse: 80  Resp: 21  Temp: 97.6 °F (36.4 °C)  Temp src: Oral  MAP (mmHg): 99    Oxygen Therapy  SpO2: 91 %  O2 Device: Nasal cannula  O2 Flow Rate (L/min): 6 L/min            Physical Exam    Physical Exam   Constitutional: Awake, alert, well appearing  Head: Normocephalic and atraumatic.   Eyes: Conjunctivae are normal. Pupils are equal, round, and reactive to light.   Neck: Normal range of motion. No JVD  Cardiovascular: Normal rate, regular rhythm  Pulmonary/Chest: Normal effort.  No accessory muscle use.  No cyanosis.  Abdominal: Soft. Not distended.  Neurological: Pt is alert and oriented to person, place, and time. no cranial nerve deficits. Speech fluent    Diffuse rales and wheezes throughout  With decent air exchange.    ED Course     Labs Reviewed   COMP METABOLIC PANEL (14) - Abnormal; Notable for the following components:       Result Value    Glucose 106 (*)     BUN 8 (*)     All other components within normal limits   LACTIC ACID, PLASMA - Normal   SARS-COV-2/FLU A AND B/RSV BY PCR (GENEXPERT) - Normal    Narrative:     This test is  intended for the qualitative detection and differentiation of SARS-CoV-2, influenza A, influenza B, and respiratory syncytial virus (RSV) viral RNA in nasopharyngeal or nares swabs from individuals suspected of respiratory viral infection consistent with COVID-19 by their healthcare provider. Signs and symptoms of respiratory viral infection due to SARS-CoV-2, influenza, and RSV can be similar.    Test performed using the Xpert Xpress SARS-CoV-2/FLU/RSV (real time RT-PCR)  assay on the GeneXpert instrument, [employer], [place] [address].   This test is being used under the Food and Drug Administration's Emergency Use Authorization.    The authorized Fact Sheet for Healthcare Providers for this assay is available upon request from the laboratory.   CBC WITH DIFFERENTIAL WITH PLATELET   LEGIONELLA URINE AG SEROGRP 1   RAINBOW DRAW LAVENDER   RAINBOW DRAW LIGHT GREEN   RAINBOW DRAW BLUE   BLOOD CULTURE   BLOOD CULTURE   SPUTUM CULTURE   RESPIRATORY FLU EXPAND PANEL + COVID-19   MRSA CULTURE ONLY     XR CHEST AP PORTABLE  (CPT=71045)    Result Date: 9/27/2024  CONCLUSION:  Slight worsening of patchy ground-glass opacities within the mid and lower lung could be due to worsening infectious or inflammatory process.   LOCATION:  [facility]      Dictated by (CST): Franklyn Emerson MD on 9/27/2024 at 12:24 PM     Finalized by (CST): Franklyn Emerson MD on 9/27/2024 at 12:28 PM          Patient was given a nebulizer treatment with some improvement.        MDM        Differential diagnoses considered: Bacterial pneumonia, viral pneumonia, pneumonitis, acute asthma exacerbation/bronchospasm all considered.    -Concern for secondary pneumonia on top of what ever viral illness he recently had versus pneumonitis.    -Rocephin azithromycin    -Scheduled nebs    -Discussed with Dr. Stewart, requested IV steroids as ordered.  Patient will be admitted primarily to the [facility] hospitalist.          I visualized the radiology studies, my independent  interpretation: Progressive inflammatory changes throughout his lungs    *Discussion of ongoing management of this patient's care included: Admitting physician    Shared decision making was done by: patient, myself.    Admission disposition: 9/27/2024 12:36 PM        Medical Decision Making      Disposition and Plan     Clinical Impression:  1. Hypoxia    2. Pneumonia of both lungs due to infectious organism, unspecified part of lung         Disposition:  Admit  9/27/2024 12:36 pm    Hospital Problems       Present on Admission  Date Reviewed: 9/27/2024            ICD-10-CM Noted POA    * (Principal) Hypoxia R09.02 9/27/2024 Unknown    Pneumonia of both lungs due to infectious organism, unspecified part of lung J18.9 9/27/2024 Unknown             Signed by Asha Tellez MD on 9/27/2024  4:11 PM         MEDICATIONS ADMINISTERED IN LAST 1 DAY:  acetaminophen (Tylenol Extra Strength) tab 1,000 mg       Date Action Dose Route User    9/30/2024 0821 Given 1,000 mg Oral Yolanda Hanson RN    9/29/2024 2028 Given 1,000 mg Oral Ismael Valenzuela RN          albuterol (Ventolin) (2.5 MG/3ML) 0.083% nebulizer solution 2.5 mg       Date Action Dose Route User    9/30/2024 1110 Given 2.5 mg Nebulization Kevin Medina RCP    9/30/2024 0715 Given 2.5 mg Nebulization Kevin Medina RCP    9/29/2024 2050 Given 2.5 mg Nebulization Giles Velarde RCP    9/29/2024 1559 Given 2.5 mg Nebulization Giles Velarde RCP          azithromycin (Zithromax) tab 500 mg       Date Action Dose Route User    9/29/2024 1456 Given 500 mg Oral Yolanda Hanson RN          benzonatate (Tessalon) cap 200 mg       Date Action Dose Route User    9/30/2024 0812 Given 200 mg Oral Yolanda Hanson RN    9/29/2024 2021 Given 200 mg Oral Ismael Valenzuela RN    9/29/2024 1456 Given 200 mg Oral Yolanda Hanson RN          budesonide (Pulmicort) 0.5 MG/2ML nebulizer suspension 0.5 mg       Date Action Dose Route User    9/30/2024 0716 Given 0.5 mg Nebulization Adam  Kevin HURTADO RCP    9/29/2024 2100 Given 0.5 mg Nebulization Giles Velarde RCP          cefTRIAXone (Rocephin) 1 g in sodium chloride 0.9% 100 mL IVPB-ADDV       Date Action Dose Route User    9/29/2024 1817 New Bag 1 g Intravenous Yolanda Hanson RN          enoxaparin (Lovenox) 40 MG/0.4ML SUBQ injection 40 mg       Date Action Dose Route User    9/29/2024 1816 Given 40 mg Subcutaneous (Right Lower Abdomen) Yolanda Hanson RN          furosemide (Lasix) 10 mg/mL injection 20 mg       Date Action Dose Route User    9/30/2024 1020 Given 20 mg Intravenous Yolanda Hanson RN          guaiFENesin ER (Mucinex) 12 hr tab 1,200 mg       Date Action Dose Route User    9/30/2024 1221 Given 1,200 mg Oral Yolanda Hanson RN          melatonin tab 3 mg       Date Action Dose Route User    9/29/2024 2020 Given 3 mg Oral Ismael Valenzuela RN          methylPREDNISolone sodium succinate (Solu-MEDROL) injection 40 mg       Date Action Dose Route User    9/30/2024 0812 Given 40 mg Intravenous Yolanda Hanson RN    9/30/2024 0236 Given 40 mg Intravenous Mary Ann Gross RN    9/29/2024 2021 Given 40 mg Intravenous Ismael Valenzuela RN    9/29/2024 1456 Given 40 mg Intravenous Yolanda Hanson RN            Vitals (last day)       Date/Time Temp Pulse Resp BP SpO2 Weight O2 Device O2 Flow Rate (L/min) [facility]    09/30/24 1214 98 °F (36.7 °C) 87 18 118/65 95 % -- High flow nasal cannula 8 L/min LW    09/30/24 0810 97.8 °F (36.6 °C) 89 20 131/80 94 % -- High flow nasal cannula 8 L/min LW    09/30/24 0720 -- -- -- -- 94 % -- -- 8 L/min MB    09/30/24 0604 98.1 °F (36.7 °C) 66 18 117/74 92 % -- -- -- AW    09/29/24 1941 98 °F (36.7 °C) 81 18 131/83 95 % -- -- -- AW    09/29/24 1558 -- -- -- -- -- -- High flow nasal cannula 8 L/min KT    09/29/24 1148 -- 96 18 134/79 91 % -- High flow nasal cannula 6 L/min DEWAYNE    09/29/24 0522 98 °F (36.7 °C) 83 20 121/78 94 % -- High flow nasal cannula 5 L/min KM            9/27/2024 H&P       Signed       Expand  All Collapse All[]Expand All by Default       [place] HOSPITALIST  History and Physical            Ben Lanza Patient Status:  Inpatient    1985 MRN [id number]   Location [facility] 4NW-A Attending Gabriele Stewart MD   Hosp Day # 0 PCP Larry Pisano MD      Chief Complaint:       Chief Complaint   Patient presents with    Difficulty Breathing            Subjective:  History of Present Illness:      Ben Lanza is a 39 year old male with a past medical history of chilhood asthma.  He smoked from age 13 until about 7 years ago.  +exposure to influenza at home. Family was improving and he was out of town.  Upon returning to [place] he started to have URI symptoms.  He saw his PCP and was started on PO ABX and inhalers.  He started to have increasing dyspnea and was referred to the ED due to hypoxia.             History/Other:  Past Medical History:  Past Medical History       Past Medical History:    Childhood asthma (HCC)        Past Surgical History:   Past Surgical History   History reviewed. No pertinent surgical history.      Family History:   Family History         Family History   Problem Relation Age of Onset    Hypertension Father      Anxiety Brother          Social History:    reports that he has quit smoking. His smoking use included cigarettes. He has never used smokeless tobacco. He reports current alcohol use of about 12.0 standard drinks of alcohol per week. He reports that he does not currently use drugs.      Allergies:   Allergies   No Known Allergies        Medications:    Medications Ordered Prior to Encounter   No current facility-administered medications on file prior to encounter.             Current Outpatient Medications on File Prior to Encounter   Medication Sig Dispense Refill    ibuprofen 200 MG Oral Tab Take 4 tablets (800 mg total) by mouth every 8 (eight) hours as needed for Pain.        Levalbuterol Tartrate 45 MCG/ACT Inhalation Aerosol INHALE 2 PUFFS BY  MOUTH UP TO FOUR TIMES DAILY AS NEEDED FOR SHORTNESS OF BREATH        amoxicillin clavulanate 875-125 MG Oral Tab Take 1 tablet by mouth 2 (two) times daily for 7 days. 14 tablet 0    benzonatate 200 MG Oral Cap Take 1 capsule (200 mg total) by mouth 2 (two) times daily as needed. 6 capsule 0    albuterol 108 (90 Base) MCG/ACT Inhalation Aero Soln Inhale 1 puff into the lungs every 4 (four) hours as needed for Wheezing or Shortness of Breath. (Patient not taking: Reported on 9/27/2024) 8 g 0            Review of Systems:   A comprehensive review of systems was completed.    Pertinent positives and negatives noted in the HPI.           Objective:  Physical Exam:    /90   Pulse 98   Temp 98.2 °F (36.8 °C) (Temporal)   Resp 21   Ht 5' 11\" (1.803 m)   Wt 230 lb (104.3 kg)   SpO2 91%   BMI 32.08 kg/m²   General: No acute distress, Alert  Respiratory: +rhonchi and intermittent wheezes  Cardiovascular: S1, S2.   Abdomen: Soft, Non-tender, Non-distended, Positive bowel sounds  Neuro: No new focal deficits  Extremities: No edema        Results:  Labs:        Labs Last 24 Hours:      Recent Labs   Lab 09/27/24  1149   WBC 9.3   HGB 15.7   MCV 86.5   .0             Recent Labs   Lab 09/27/24  1149   *   BUN 8*   CREATSERUM 0.90   CA 9.6   ALB 4.5      K 3.7      CO2 25.0   ALKPHO 87   AST 30   ALT 31   BILT 0.4   TP 7.8         Estimated Creatinine Clearance: 115 mL/min (based on SCr of 0.9 mg/dL).     No results for input(s): \"TROP\", \"TROPHS\", \"CK\" in the last 168 hours.     No results for input(s): \"PTP\", \"INR\" in the last 168 hours.     No results for input(s): \"TROP\", \"CK\" in the last 168 hours.        Imaging: Imaging data reviewed in Epic.        Assessment & Plan:  #PNA/bronchitis with bronchospasms  Suspect viral etiology  PPX ABX  RVP  Nebs  Add budesonide  Solumedrol  CT chest if not improving           All diagnosis' and recommendations discussed with patient and/or family in  detail.       Plan of care discussed with ED physician        Gabriele Stewart MD                        2024 Critical Care Consult     [facility]  Report of Consultation           Ben Lanza Patient Status:  Inpatient    1985 MRN [id number]   [facility] 4NW-A Attending Gabriele Stewart MD   Hosp Day # 1 PCP Larry Pisano MD      Reason for Consultation:  hypoxia     History of Present Illness:  Ben Lanaz is a a(n) 39 year old male former smoker (quit 2017) with PMH asthma who was admitted with worsening dyspnea, cough and hypoxia. He explains he had been well until about two weeks ago he developed URI symptoms with sinus congestion/drainage.  His daughter and wife had previously been ill with the flu - he was not ill at the time. He developed cough, congestion and dyspnea and was seen by teleheatlh, treated for influenza with tamiflu with eventual resolution of the URI symptoms but persisted with cough and dyspnea. He had a visit with his PCP earlier this week and started on treatment for pneumonia with augmentin with little change. He had follow up office visit yesterday and noted hypoxic leading to his ER visit.    He does report wheezing during this time as well. He was started on treatment for pneumonia and asthma exacerbation, however today was noted to have worsening hypoxia with escalation of supplemental o2 from 6L to 15L.   He feels the same presently - continues with minimally productive cough, dyspnea and chest tightness - all worse when laying supine.   +fevers , chills and night sweats for the past two weeks.  No chest pain/pressure. No BLE edema. No rash or skin lesions.  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 No pertinent surgical history.     Family History         Family History   Problem Relation Age of Onset    Hypertension Father      Anxiety Brother            reports that he has quit smoking. His smoking use included cigarettes. He has never used smokeless tobacco. He reports current alcohol use of about 12.0 standard drinks of alcohol per week. He reports that he does not currently use drugs.     Allergies:  Allergies   No Known Allergies        Medications:  reviewed  Scheduled Medications    azithromycin  500 mg Oral Daily    methylPREDNISolone  40 mg Intravenous Q12H    enoxaparin  40 mg Subcutaneous Daily    cefTRIAXone  1 g Intravenous Q24H    guaiFENesin ER  1,200 mg Oral BID    benzonatate  200 mg Oral TID    albuterol  2.5 mg Nebulization Q4H WA (5 times daily)    budesonide  0.5 mg Nebulization 2 times daily           PRN Medications     acetaminophen    melatonin    glycerin-hypromellose-    sodium chloride    ondansetron    prochlorperazine    polyethylene glycol (PEG 3350)    sennosides    bisacodyl    fleet enema    HYDROcodone-homatropine        Review of Systems:   Constitutional: see HPI  Eyes: Negative for visual disturbance, irritation and redness.  Ears, nose, mouth, throat, and face: Negative for hearing loss, tinnitus, nasal congestion, snoring, sore throat, hoarseness and voice change.  Respiratory: see HPI  Cardiovascular: Negative for chest pain, palpitations, irregular heart beats, syncope, fatigue, orthopnea, paroxysmal nocturnal dyspnea, lower extremity edema.  Gastrointestinal: Negative for dysphagia, odynophagia, reflux symptoms, nausea, vomiting, change in bowel habits, diarrhea, constipation and abdominal pain.  Integument/breast: Negative for rash, skin lesions, and pruritus.  Hematologic/lymphatic: Negative for easy bruising, bleeding, and lymphadenopathy.  Musculoskeletal: Negative for myalgias, arthralgias, muscle weakness.  Neurological: Negative for headaches,  dizziness, seizures, memory problems, trouble swallowing, speech problems, gait problems and weakness.  : Denies dysuria, hematuria, urinary retention.  Behavioral/Psych: Normal affect, mood, speech. No AVH, No SI/HI     All other review of systems are negative.     Vital signs in last 24 hours:  Patient Vitals for the past 24 hrs:    BP Temp Temp src Pulse Resp SpO2 Height Weight   09/28/24 1025 140/79 98.1 °F (36.7 °C) Oral 98 18 93 % -- --   09/28/24 1013 -- -- -- 106 -- 90 % -- --   09/28/24 1012 -- -- -- 107 24 90 % -- --   09/28/24 1008 -- -- -- 106 -- 90 % -- --   09/28/24 0814 -- -- -- 96 24 92 % -- --   09/28/24 0809 -- -- -- -- -- (!) 88 % -- --   09/28/24 0718 114/75 97.7 °F (36.5 °C) Oral 91 19 -- -- --   09/28/24 0535 -- -- -- -- 18 -- -- --   09/28/24 0349 131/85 97.9 °F (36.6 °C) Oral 93 -- -- -- --   09/28/24 0300 -- -- -- -- -- 92 % -- --   09/27/24 2305 -- -- -- -- -- 91 % -- --   09/27/24 2034 (!) 150/93 98.7 °F (37.1 °C) Oral 100 -- 90 % -- --   09/27/24 1613 -- -- -- 88 18 91 % -- --   09/27/24 1431 134/89 97.6 °F (36.4 °C) Oral 80 -- 91 % -- 229 lb 15 oz (104.3 kg)   09/27/24 1330 149/90 -- -- 98 21 91 % -- --   09/27/24 1215 (!) 146/99 -- -- 88 (!) 27 92 % -- --   09/27/24 1126 (!) 158/98 98.2 °F (36.8 °C) Temporal 87 24 (!) 82 % 5' 11\" (1.803 m) 230 lb (104.3 kg)         Intake/Output:     Intake/Output Summary (Last 24 hours) at 9/28/2024 1109  Last data filed at 9/28/2024 0537      Gross per 24 hour   Intake 250 ml   Output 300 ml   Net -50 ml         PHYSICAL EXAM  GEN: Appears alert, comfortable. No acute distress  PSYCH: Normal mood and affect, AAOX3  NEURO: CN 2-12 grossly intact, no focal deficits  HEENT: Atraumatic, normocephalic, EOMI, no icterus/hemorrhage, no conjunctival injection/discharge, nares normal  MOUTH: MMM, good dentition  NECK: Trachea midline, symmetric, no visible masses or scars, no crepitus, normal flexion/extension  CHEST: Normal chest excursion, no visible  deformity or scars, no tenderness to palpation  PULMONARY:crackles posteriorly. CTAB anteriorly. No wheeze heard. No distress on 15L  COR: RRR no m  GI: NABS X 4, S/NT/ND, No hernias or HSM  LYMPHATIC: No palpable or visible lymphadenopathy in neck, axillae, groin  MSK: Normal strength and sensation in all extremities  EXT: No overt deformities, No C/C/E, 2+ DP/PT pulses b/l   SKIN: No rashes, ulcers, nodules     Lab Data Review:      Recent Labs   Lab 09/27/24  1149   *   BUN 8*   CREATSERUM 0.90   CA 9.6      K 3.7      CO2 25.0          Recent Labs   Lab 09/27/24  1149   RBC 5.33   HGB 15.7   HCT 46.1   MCV 86.5   MCH 29.5   MCHC 34.1   RDW 12.7   NEPRELIM 6.85   WBC 9.3   .0      No results for input(s): \"BNP\" in the last 168 hours.  No results for input(s): \"TROP\", \"CK\" in the last 168 hours.  No results for input(s): \"PT\", \"INR\", \"PTT\" in the last 168 hours.     Other Labs:      ABG:      Recent Labs   Lab 09/28/24  1054   ABGPHT 7.50*   IMXIBN8E 32*   YWREP4X 62*   ABGHCO3 26.7   ABGBE 2.5*   TEMP 98.6   TERRELL Positive   SITE Left Radial   DEV High flow nasal cannula   THGB 15.4         Cultures:   No results found for this visit on 09/27/24.  No results for input(s): \"COLORUR\", \"CLARITY\", \"SPECGRAVITY\", \"GLUUR\", \"BILUR\", \"KETUR\", \"BLOODURINE\", \"PHURINE\", \"PROUR\", \"UROBILINOGEN\", \"NITRITE\", \"LEUUR\", \"WBCUR\", \"RBCUR\", \"BACUR\", \"EPIUR\" in the last 168 hours.    Radiology:   Reviewed personally  XR CHEST AP PORTABLE  (CPT=71045)     Result Date: 9/27/2024  CONCLUSION:  Slight worsening of patchy ground-glass opacities within the mid and lower lung could be due to worsening infectious or inflammatory process.   LOCATION:  [facility]      Dictated by (CST): Franklyn Emerson MD on 9/27/2024 at 12:24 PM     Finalized by (CST): Franklyn Emerson MD on 9/27/2024 at 12:28 PM         ASSESSMENT  Acute hypoxic respiratory failure due to pneumonia, bacterial superinfection, with recent viral URI  Bilateral  opacities on chest imaging with lower lobe consolidation - likely pneumonia, possibly atypical infection. With reported recent influenza infection, at risk for strep.  Also would consider inflammatory/autoimmune such as eosinophilic pneumonia.   Asthma exacerbation related to above  Hx tobacco abuse - quit 2017     PLAN  Continue close monitoring of respiratory status, wean o2 for sats >89%. Given his worsening hypoxia, likely will need to escalate to vapotherm.   Continue empiric abx, azith/ceftriaxone day 2; will add vancomycin IV. send sputum if able; blood cx pending  Continue scheduled nebs - albuterol, budesonide  Will increase methylpred frequency  Check procalcitonin, proBNP, fungitell, fungal ab survey, quantiferon  Check urine ag for strep and histo  Obtain CTA chest  Follow fluid balance, lytes, urine output  PPX: LMWH  Dispo: floor - tx to PMU to allow access to vapotherm. Low threshold for transfer to ICU     Thank you for the consultation.  Will follow with you.     Chang Temple MD      2024 Hospitalist Progress Note    Ben DE LA PAZ [facility]essmileys Patient Status:  Inpatient    1985 MRN [id number]   Location [facility] 5NW-A Attending Gabriele Stewart MD   Hosp Day # 1 PCP Larry Pisano MD      Chief Complaint: dyspea/cough        Subjective:  Patient feels ok though has had increasing O2 needs           Objective:  Review of Systems:   ROS completed; pertinent positive and negatives stated in subjective.     Vital signs:  Temp:  [97.6 °F (36.4 °C)-98.7 °F (37.1 °C)] 98 °F (36.7 °C)  Pulse:  [] 96  Resp:  [18-24] 22  BP: (114-150)/(75-93) 129/76  SpO2:  [88 %-94 %] 94 %     Physical Exam:    General: No acute distress  Respiratory: dec AE with ronchi  Cardiovascular: S1, S2.  Abdomen: Soft  Neuro: No new focal deficits        Diagnostic Data:    Labs:      Recent Labs   Lab 24  1149   WBC 9.3   HGB 15.7   MCV 86.5   .0             Recent Labs   Lab 24  1149   GLU  106*   BUN 8*   CREATSERUM 0.90   CA 9.6   ALB 4.5      K 3.7      CO2 25.0   ALKPHO 87   AST 30   ALT 31   BILT 0.4   TP 7.8         Estimated Creatinine Clearance: 115 mL/min (based on SCr of 0.9 mg/dL).      No results for input(s): \"TROP\", \"TROPHS\", \"CK\" in the last 168 hours.     No results for input(s): \"PTP\", \"INR\" in the last 168 hours.           Imaging: Imaging data reviewed in Epic.     Medications:   Scheduled Medications    azithromycin  500 mg Oral Daily    vancomycin  1,750 mg Intravenous Q12H    methylPREDNISolone  40 mg Intravenous Q6H    enoxaparin  40 mg Subcutaneous Daily    cefTRIAXone  1 g Intravenous Q24H    guaiFENesin ER  1,200 mg Oral BID    benzonatate  200 mg Oral TID    albuterol  2.5 mg Nebulization Q4H WA (5 times daily)    budesonide  0.5 mg Nebulization 2 times daily           Assessment & Plan:  #Acute Hypoxic resp failure  On vapotherm, wean as able  CTA 9/28 independently reviewed:  b/l PNA, no PE     #PNA  Cont. Rocrphine/azithro  MRSA nares pending  Vanco added today  RVP neg  COVID neg  Legionella neg     #Bronchospasms  Cont. Steroids  BD protocol  Budesonide nebs     Dispo: as above. Started on vapotherm.  Discussed with pulm     Gabriele Stewart MD     9/29/2024 Critical Care Progress Note      [facility] Progress Note      SUBJECTIVE/Interval history:  No acute events overnight, he feels better today with less cough/dyspnea. O2 weaned to 5-6L. No fevers     Review of Systems:   A comprehensive 14 point review of systems was completed.   Pertinent positives and negatives noted in the HPI.     Medications  Reviewed personally  Scheduled Medications    azithromycin  500 mg Oral Daily    vancomycin  1,750 mg Intravenous Q12H    methylPREDNISolone  40 mg Intravenous Q6H    albuterol  2.5 mg Nebulization QID    enoxaparin  40 mg Subcutaneous Daily    cefTRIAXone  1 g Intravenous Q24H    guaiFENesin ER  1,200 mg Oral BID    benzonatate   200 mg Oral TID    budesonide  0.5 mg Nebulization 2 times daily           PRN Medications     acetaminophen    melatonin    glycerin-hypromellose-    sodium chloride    ondansetron    prochlorperazine    polyethylene glycol (PEG 3350)    sennosides    bisacodyl    fleet enema    HYDROcodone-homatropine        OBJECTIVE:  Vitals          Vitals:     09/28/24 1320 09/28/24 1421 09/28/24 2012 09/29/24 0522   BP: 129/76   128/85 121/78   BP Location: Left arm   Left arm Left arm   Pulse: 96   94 83   Resp: 22   20 20   Temp: 98 °F (36.7 °C)   98 °F (36.7 °C) 98 °F (36.7 °C)   TempSrc: Oral   Oral Oral   SpO2: 94% 93% 93% 94%   Weight:           Height:                    Vital signs in last 24 hours:  Blood pressure 121/78, pulse 83, temperature 98 °F (36.7 °C), temperature source Oral, resp. rate 20, height 5' 11\" (1.803 m), weight 229 lb 15 oz (104.3 kg), SpO2 94%.      Intake/Output:  I/O last 3 completed shifts:  In: 490 [P.O.:490]  Out: 700 [Urine:700]     Physical Exam:  General: Appears alert, comfortable. No acute distress  Neurologic:No focal deficits.  Alert.  Oriented.  Lungs:improved lung aeration bilaterally. crackles posteriorly. No wheeze. No distress  Heart:RRR no m  Abdomen:Soft, non-tender.  No masses.  No guarding.  No rebound.  Extremities:no edema     Lab Data Review:       Recent Labs   Lab 09/27/24  1149   *   BUN 8*   CREATSERUM 0.90   CA 9.6      K 3.7      CO2 25.0          Recent Labs   Lab 09/27/24  1149   RBC 5.33   HGB 15.7   HCT 46.1   MCV 86.5   MCH 29.5   MCHC 34.1   RDW 12.7   NEPRELIM 6.85   WBC 9.3   .0      No results for input(s): \"BNP\" in the last 168 hours.  No results for input(s): \"TROP\", \"CK\" in the last 168 hours.  No results for input(s): \"PT\", \"INR\", \"PTT\" in the last 168 hours.      Recent Labs   Lab 09/28/24  1054   ABGPHT 7.50*   RQJKEN1P 32*   QSNNC2D 62*   ABGHCO3 26.7   ABGBE 2.5*   TEMP 98.6   TERRELL Positive   SITE Left Radial   DEV  High flow nasal cannula   THGB 15.4         Other Labs:  Interval Culture Data:         Hospital Encounter on 09/27/24   1. Blood Culture     Status: None (Preliminary result)     Collection Time: 09/27/24 11:49 AM     Specimen: Blood,peripheral   Result Value Ref Range     Blood Culture Result No Growth 1 Day N/A      No results for input(s): \"COLORUR\", \"CLARITY\", \"SPECGRAVITY\", \"GLUUR\", \"BILUR\", \"KETUR\", \"BLOODURINE\", \"PHURINE\", \"PROUR\", \"UROBILINOGEN\", \"NITRITE\", \"LEUUR\", \"WBCUR\", \"RBCUR\", \"BACUR\", \"EPIUR\" in the last 168 hours.     Interval Radiology:   Reviewed personally  CTA CHEST (CPT=71275)     Result Date: 9/28/2024  CONCLUSION:  1. There is multifocal consolidation in lower lobes bilaterally and lingula which most likely indicates multifocal pneumonia. 2. In the aerated portions of the lungs there is mixed mosaic type attenuation which is more likely related air trapping although could represent an early sign of edema. 3. There is no pulmonary embolism.     LOCATION:  [facility]   Dictated by (CST): Jake Novoa MD on 9/28/2024 at 1:01 PM     Finalized by (CST): Jake Novoa MD on 9/28/2024 at 1:03 PM        XR CHEST AP PORTABLE  (CPT=71045)     Result Date: 9/27/2024  CONCLUSION:  Slight worsening of patchy ground-glass opacities within the mid and lower lung could be due to worsening infectious or inflammatory process.   LOCATION:  [facility]      Dictated by (CST): Franklyn Emerson MD on 9/27/2024 at 12:24 PM     Finalized by (CST): Franklyn Emerson MD on 9/27/2024 at 12:28 PM         ASSESSMENT  Acute hypoxic respiratory failure suspect post viral pneumonitis vs atypical infection. Labs not suggestive of bacterial infection  Bilateral opacities on chest imaging with lower lobe consolidation - atelectasis and/or possibly atypical infection. With reported recent influenza infection, at risk for strep. Also would consider inflammatory/autoimmune such as eosinophilic pneumonia.   Asthma exacerbation related to  above  Hx tobacco abuse - quit 2017     PLAN  Continue close monitoring of respiratory status, wean o2 for sats >89%.    Continue empiric abx, azith/ceftriaxone day 3; will stop vanc today  Continue scheduled nebs - albuterol, budesonide  Continue on methylpred  Fungal testing pending  Follow fluid balance, lytes, urine output  PPX: LMWH  Dispo: floor      MD Pat Olvera Faisal, MD   Physician  Hospitalist     Progress Notes     Signed     Date of Service: 2024 11:32 AM     Signed       [facility]   part of [facility]     Hospitalist Progress Note            Ben DE LA PAZ Lavonjoy Patient Status:  Inpatient    1985 MRN [id number]   Location [facility] 5NW-A Attending Gabriele Stewart MD   Hosp Day # 2 PCP Larry Pisano MD      Chief Complaint: dyspea/cough        Subjective:  Patient feels better today.  Remains on O2           Objective:  Review of Systems:   ROS completed; pertinent positive and negatives stated in subjective.     Vital signs:  Temp:  [98 °F (36.7 °C)] 98 °F (36.7 °C)  Pulse:  [] 83  Resp:  [20-22] 20  BP: (121-129)/(76-85) 121/78  SpO2:  [93 %-94 %] 94 %     Physical Exam:    General: No acute distress  Respiratory: b/l rhonchi  Cardiovascular: S1, S2.  Abdomen: Soft  Neuro: No new focal deficits        Diagnostic Data:    Labs:      Recent Labs   Lab 24  1149   WBC 9.3   HGB 15.7   MCV 86.5   .0             Recent Labs   Lab 24  1149   *   BUN 8*   CREATSERUM 0.90   CA 9.6   ALB 4.5      K 3.7      CO2 25.0   ALKPHO 87   AST 30   ALT 31   BILT 0.4   TP 7.8         Estimated Creatinine Clearance: 115 mL/min (based on SCr of 0.9 mg/dL).      No results for input(s): \"TROP\", \"TROPHS\", \"CK\" in the last 168 hours.     No results for input(s): \"PTP\", \"INR\" in the last 168 hours.           Imaging: Imaging data reviewed in Epic.     Medications:   Scheduled Medications    azithromycin  500 mg  Oral Daily    vancomycin  1,750 mg Intravenous Q12H    methylPREDNISolone  40 mg Intravenous Q6H    albuterol  2.5 mg Nebulization QID    enoxaparin  40 mg Subcutaneous Daily    cefTRIAXone  1 g Intravenous Q24H    guaiFENesin ER  1,200 mg Oral BID    benzonatate  200 mg Oral TID    budesonide  0.5 mg Nebulization 2 times daily                  Assessment & Plan:  #Acute Hypoxic resp failure  Cont. O2 and wean as able  CTA:  b/l PNA, no PE     #PNA  Cont. rocephin/azithro/vanco  MRSA nares neg  RVP neg  COVID neg  Legionella neg  SCX: neg  PCT: 0.1  Will f/u with pulm and have repeat imaging to r/o other underlying pathology     #Bronchospasms  Cont. Steroids  BD protocol  Budesonide nebs        Dispo: as above. Discussed with pulm. Remains hypoxic.  ? Abld to DC vanco with neg MRSA nares.          Gabriele Stewart MD                    2024 Hospitalist Progress Note    Date of Service: 2024 12:16 PM     [facility]   part of [facility]     Hospitalist Progress Note            Ben Lanza Patient Status:  Inpatient    1985 MRN [id number]   Location [facility] 5NW-A Attending Gabriele Stewart MD   Hosp Day # 3 PCP Larry Pisano MD      Chief Complaint: dyspea/cough        Subjective:  Patient feels ok.  Dyspnea a bit better.            Objective:  Review of Systems:   ROS completed; pertinent positive and negatives stated in subjective.     Vital signs:  Temp:  [97.8 °F (36.6 °C)-98.1 °F (36.7 °C)] 98 °F (36.7 °C)  Pulse:  [66-89] 87  Resp:  [18-20] 18  BP: (117-131)/(65-83) 118/65  SpO2:  [92 %-95 %] 95 %     Physical Exam:    General: No acute distress  Respiratory: b/l rhonchi  Cardiovascular: S1, S2.  Abdomen: Soft  Neuro: No new focal deficits        Diagnostic Data:    Labs:      Recent Labs   Lab 24  1149   WBC 9.3   HGB 15.7   MCV 86.5   .0              Recent Labs   Lab 24  1149 24  0949   * 218*   BUN 8* 15    CREATSERUM 0.90 0.97   CA 9.6 9.3   ALB 4.5  --     137   K 3.7 4.1    105   CO2 25.0 23.0   ALKPHO 87  --    AST 30  --    ALT 31  --    BILT 0.4  --    TP 7.8  --          Estimated Creatinine Clearance: 106.7 mL/min (based on SCr of 0.97 mg/dL).      No results for input(s): \"TROP\", \"TROPHS\", \"CK\" in the last 168 hours.     No results for input(s): \"PTP\", \"INR\" in the last 168 hours.           Imaging: Imaging data reviewed in Epic.     Medications:   Scheduled Medications    methylPREDNISolone  40 mg Intravenous Q6H    albuterol  2.5 mg Nebulization QID    enoxaparin  40 mg Subcutaneous Daily    cefTRIAXone  1 g Intravenous Q24H    guaiFENesin ER  1,200 mg Oral BID    benzonatate  200 mg Oral TID    budesonide  0.5 mg Nebulization 2 times daily                  Assessment & Plan:  #Acute Hypoxic resp failure  Cont. O2 and wean as able  CTA:  b/l PNA, no PE     #PNA  Cont. rocephin/azithro  MRSA nares neg, vanco stopped  RVP neg  COVID neg  Legionella neg  SCX: neg  PCT: 0.1  Will f/u with pulm and have repeat imaging to r/o other underlying pathology     #Bronchospasms  Cont. Steroids  BD protocol  Budesonide nebs     #Steroid hyperglycemia  monitor        Dispo: as above. Discussed with pulm. Remains hypoxic.  Dose of lasix.  Cont. To wean O2 as able        Gabriele Stewart MD

## 2024-09-30 NOTE — PLAN OF CARE
Assumed care at 2300. Aox4, 8L RA BL. IV steriods, iv abx. No tele. Lovenox. Continent B and B. Pt resting comfortably in bed. Wean o2 as able. Will follow.     Problem: Patient/Family Goals  Goal: Patient/Family Long Term Goal  Description: Patient's Long Term Goal: Discharge with adequate resources    Interventions:  - See additional Care Plan goals for specific interventions  Outcome: Progressing  Goal: Patient/Family Short Term Goal  Description: Patient's Short Term Goal:   9/28 AM: Wean O2 as tolerated  9/28noc: wean O2  9/29 AM: Wean O2 as tolerated  9/29 noc; wean oxygen    Interventions:   - See additional Care Plan goals for specific interventions  Outcome: Progressing     Problem: CARDIOVASCULAR - ADULT  Goal: Maintains optimal cardiac output and hemodynamic stability  Description: INTERVENTIONS:  - Monitor vital signs, rhythm, and trends  - Monitor for bleeding, hypotension and signs of decreased cardiac output  - Evaluate effectiveness of vasoactive medications to optimize hemodynamic stability  - Monitor arterial and/or venous puncture sites for bleeding and/or hematoma  - Assess quality of pulses, skin color and temperature  - Assess for signs of decreased coronary artery perfusion - ex. Angina  - Evaluate fluid balance, assess for edema, trend weights  Outcome: Progressing     Problem: RESPIRATORY - ADULT  Goal: Achieves optimal ventilation and oxygenation  Description: INTERVENTIONS:  - Assess for changes in respiratory status  - Assess for changes in mentation and behavior  - Position to facilitate oxygenation and minimize respiratory effort  - Oxygen supplementation based on oxygen saturation or ABGs  - Provide Smoking Cessation handout, if applicable  - Encourage broncho-pulmonary hygiene including cough, deep breathe, Incentive Spirometry  - Assess the need for suctioning and perform as needed  - Assess and instruct to report SOB or any respiratory difficulty  - Respiratory Therapy support as  indicated  - Manage/alleviate anxiety  - Monitor for signs/symptoms of CO2 retention  Outcome: Progressing

## 2024-09-30 NOTE — PROGRESS NOTES
[facility]   part of [facility]     Hospitalist Progress Note     Ben Lanza Patient Status:  Inpatient    1985 MRN [id number]   Location [facility] 5NW-A Attending Gabriele Stewart MD   Hosp Day # 3 PCP Larry Pisano MD     Chief Complaint: dyspea/cough    Subjective:     Patient feels ok.  Dyspnea a bit better.     Objective:    Review of Systems:   ROS completed; pertinent positive and negatives stated in subjective.    Vital signs:  Temp:  [97.8 °F (36.6 °C)-98.1 °F (36.7 °C)] 98 °F (36.7 °C)  Pulse:  [66-89] 87  Resp:  [18-20] 18  BP: (117-131)/(65-83) 118/65  SpO2:  [92 %-95 %] 95 %    Physical Exam:    General: No acute distress  Respiratory: b/l rhonchi  Cardiovascular: S1, S2.  Abdomen: Soft  Neuro: No new focal deficits      Diagnostic Data:    Labs:  Recent Labs   Lab 24  1149   WBC 9.3   HGB 15.7   MCV 86.5   .0       Recent Labs   Lab 24  1149 24  0949   * 218*   BUN 8* 15   CREATSERUM 0.90 0.97   CA 9.6 9.3   ALB 4.5  --     137   K 3.7 4.1    105   CO2 25.0 23.0   ALKPHO 87  --    AST 30  --    ALT 31  --    BILT 0.4  --    TP 7.8  --        Estimated Creatinine Clearance: 106.7 mL/min (based on SCr of 0.97 mg/dL).     No results for input(s): \"TROP\", \"TROPHS\", \"CK\" in the last 168 hours.    No results for input(s): \"PTP\", \"INR\" in the last 168 hours.           Imaging: Imaging data reviewed in Epic.    Medications:    methylPREDNISolone  40 mg Intravenous Q6H    albuterol  2.5 mg Nebulization QID    enoxaparin  40 mg Subcutaneous Daily    cefTRIAXone  1 g Intravenous Q24H    guaiFENesin ER  1,200 mg Oral BID    benzonatate  200 mg Oral TID    budesonide  0.5 mg Nebulization 2 times daily       Assessment & Plan:     #Acute Hypoxic resp failure  Cont. O2 and wean as able  CTA:  b/l PNA, no PE    #PNA  Cont. rocephin/azithro  MRSA nares neg, vanco stopped  RVP neg  COVID neg  Legionella neg  SCX: neg  PCT: 0.1  Will f/u with  pulm and have repeat imaging to r/o other underlying pathology    #Bronchospasms  Cont. Steroids  BD protocol  Budesonide nebs    #Steroid hyperglycemia  monitor      Dispo: as above. Discussed with pulm. Remains hypoxic.  Dose of lasix.  Cont. To wean O2 as able      Gabriele Stewart MD    Supplementary Documentation:   P(1) + D(C1+C3)  Quality:    DVT Mechanical Prophylaxis:     Early ambuation  DVT Pharmacologic Prophylaxis   Medication    enoxaparin (Lovenox) 40 MG/0.4ML SUBQ injection 40 mg                Code Status: Not on file  Owens: No urinary catheter in place  Owens Duration (in days):   Central line:    ELDER:       Discharge is dependent on: clinical stability  At this point Mr. Lanza is expected to be discharge to: home    The 21st Century Cures Act makes medical notes like these available to patients in the interest of transparency. Please be advised this is a medical document. Medical documents are intended to carry relevant information, facts as evident, and the clinical opinion of the practitioner. The medical note is intended as peer to peer communication and may appear blunt or direct. It is written in medical language and may contain abbreviations or verbiage that are unfamiliar.

## 2024-09-30 NOTE — PROGRESS NOTES
Patient A&O X 4. 8L of O2. IV Abx. IV steroids. No Tele. Lovenox. Voids. PRN Tyenol for pain. Regular diet. Up self. No further needs at this time.     2300: Report given to Anastasiya HAYES.

## 2024-09-30 NOTE — PROGRESS NOTES
[facility]  Pulmonary/Critical Care/Sleep Medicine Progress note    Ben Lanza Patient Status:  Inpatient    1985 MRN [id number]   Location [facility] 5NW-A Attending Gabriele Stewart MD   Hosp Day # 3 PCP Larry Pisano MD     Chief Complaint   Patient presents with    Difficulty Breathing        History of Present Illness:  Stable overnight.  Feels better.    History:  Past Medical History:    Childhood asthma (HCC)     History reviewed. No pertinent surgical history.  Family History   Problem Relation Age of Onset    Hypertension Father     Anxiety Brother       reports that he has quit smoking. His smoking use included cigarettes. He has never used smokeless tobacco. He reports current alcohol use of about 12.0 standard drinks of alcohol per week. He reports that he does not currently use drugs.    Allergies:  No Known Allergies    Medications:    Current Facility-Administered Medications:     temazepam (Restoril) cap 15 mg, 15 mg, Oral, Nightly PRN    methylPREDNISolone sodium succinate (Solu-MEDROL) injection 40 mg, 40 mg, Intravenous, Q6H    albuterol (Ventolin) (2.5 MG/3ML) 0.083% nebulizer solution 2.5 mg, 2.5 mg, Nebulization, QID    acetaminophen (Tylenol Extra Strength) tab 1,000 mg, 1,000 mg, Oral, Q4H PRN    melatonin tab 3 mg, 3 mg, Oral, Nightly PRN    glycerin-hypromellose- (Artificial Tears) 0.2-0.2-1 % ophthalmic solution 1 drop, 1 drop, Both Eyes, QID PRN    sodium chloride (Saline Mist) 0.65 % nasal solution 1 spray, 1 spray, Each Nare, Q3H PRN    enoxaparin (Lovenox) 40 MG/0.4ML SUBQ injection 40 mg, 40 mg, Subcutaneous, Daily    ondansetron (Zofran) 4 MG/2ML injection 4 mg, 4 mg, Intravenous, Q6H PRN    prochlorperazine (Compazine) 10 MG/2ML injection 5 mg, 5 mg, Intravenous, Q8H PRN    polyethylene glycol (PEG 3350) (Miralax) 17 g oral packet 17 g, 17 g, Oral, Daily PRN    sennosides (Senokot) tab 17.2 mg, 17.2 mg, Oral, Nightly PRN    bisacodyl (Dulcolax)  10 MG rectal suppository 10 mg, 10 mg, Rectal, Daily PRN    fleet enema (Fleet) rectal enema 133 mL, 1 enema, Rectal, Once PRN    cefTRIAXone (Rocephin) 1 g in sodium chloride 0.9% 100 mL IVPB-ADDV, 1 g, Intravenous, Q24H    guaiFENesin ER (Mucinex) 12 hr tab 1,200 mg, 1,200 mg, Oral, BID    benzonatate (Tessalon) cap 200 mg, 200 mg, Oral, TID    HYDROcodone-homatropine (Hydromet) 5-1.5 MG/5ML oral syrup 5 mL, 5 mL, Oral, Q4H PRN    budesonide (Pulmicort) 0.5 MG/2ML nebulizer suspension 0.5 mg, 0.5 mg, Nebulization, 2 times daily    Intake/Output:    Intake/Output Summary (Last 24 hours) at 9/30/2024 1511  Last data filed at 9/30/2024 1418  Gross per 24 hour   Intake 360 ml   Output 550 ml   Net -190 ml          Review of Systems    Review of Systems: A comprehensive 10 point review of systems was completed.  Pertinent positives and negatives noted in the the HPI.         Patient Vitals for the past 24 hrs:   BP Temp Temp src Pulse Resp SpO2   09/30/24 1214 118/65 98 °F (36.7 °C) Oral 87 18 95 %   09/30/24 0810 131/80 97.8 °F (36.6 °C) Oral 89 20 94 %   09/30/24 0720 -- -- -- -- -- 94 %   09/30/24 0604 117/74 98.1 °F (36.7 °C) Oral 66 18 92 %   09/29/24 1941 131/83 98 °F (36.7 °C) Oral 81 18 95 %     Vitals:    09/30/24 0604 09/30/24 0720 09/30/24 0810 09/30/24 1214   BP: 117/74  131/80 118/65   BP Location: Left arm  Left arm Left arm   Pulse: 66  89 87   Resp: 18  20 18   Temp: 98.1 °F (36.7 °C)  97.8 °F (36.6 °C) 98 °F (36.7 °C)   TempSrc: Oral  Oral Oral   SpO2: 92% 94% 94% 95%   Weight:       Height:          Body mass index is 32.07 kg/m².     Physical Exam  Constitutional:       General: He is not in acute distress.     Appearance: Normal appearance. He is not ill-appearing or diaphoretic.   HENT:      Head: Normocephalic and atraumatic.      Nose: Nose normal. No congestion or rhinorrhea.      Mouth/Throat:      Mouth: Mucous membranes are moist.      Pharynx: Oropharynx is clear. No oropharyngeal exudate or  posterior oropharyngeal erythema.   Eyes:      Extraocular Movements: Extraocular movements intact.      Pupils: Pupils are equal, round, and reactive to light.   Cardiovascular:      Rate and Rhythm: Normal rate.      Pulses: Normal pulses.      Heart sounds: Normal heart sounds. No murmur heard.  Pulmonary:      Effort: Pulmonary effort is normal. No respiratory distress.      Breath sounds: Normal breath sounds. No wheezing or rhonchi.   Chest:      Chest wall: No tenderness.   Abdominal:      General: Abdomen is flat. Bowel sounds are normal.      Palpations: Abdomen is soft.   Musculoskeletal:         General: Normal range of motion.   Skin:     General: Skin is warm.   Neurological:      General: No focal deficit present.      Mental Status: He is alert and oriented to person, place, and time.   Psychiatric:         Mood and Affect: Mood normal.         Behavior: Behavior normal.         Thought Content: Thought content normal.         Judgment: Judgment normal.           Lab Data Review:    Recent Labs   Lab 09/27/24  1149   WBC 9.3   HGB 15.7   HCT 46.1   .0       Recent Labs   Lab 09/27/24  1149 09/30/24  0949    137   K 3.7 4.1    105   CO2 25.0 23.0   BUN 8* 15   CREATSERUM 0.90 0.97   CA 9.6 9.3   ALB 4.5  --    ALKPHO 87  --    ALT 31  --    AST 30  --    * 218*       Recent Labs   Lab 09/30/24  0949   MG 2.2       Lab Results   Component Value Date    PHOS 3.7 09/30/2024        No results for input(s): \"PT\", \"INR\", \"PTT\" in the last 168 hours.    Recent Labs   Lab 09/28/24  1054   ABGPHT 7.50*   JWIIOM3A 32*   FUTEV6M 62*   ABGHCO3 26.7   ABGBE 2.5*   TEMP 98.6   TERRELL Positive   SITE Left Radial   DEV High flow nasal cannula   THGB 15.4       No results for input(s): \"TROP\", \"CKMB\" in the last 168 hours.      Cultures:   Hospital Encounter on 09/27/24   1. Blood Culture     Status: None (Preliminary result)    Collection Time: 09/27/24 11:49 AM    Specimen: Blood,peripheral    Result Value Ref Range    Blood Culture Result No Growth 2 Days N/A            Radiology personally reviewed:  No results found.     Patient Active Problem List   Diagnosis    Hypoxia    Pneumonia of both lungs due to infectious organism, unspecified part of lung    Acute hypoxic respiratory failure (HCC)    Severe persistent asthma with exacerbation (HCC)    Therapeutic drug monitoring     Assessment:  Acute hypoxic respiratory failure suspect related to postviral pneumonitis versus atypical infection: Requiring supplemental oxygen 6 L/min via nasal cannula  Bilateral lower lobe patchy consolidation: Clinically improving  Asthma with acute exacerbation  History of tobacco use      Plan:  Wean FiO2 to keep oxygen saturations between 90% to 92% continue current antibiotics  Budesonide 0.5 mg nebulizers every 12 hours  DuoNebs 4 times daily  Continue current antibiotics  Solu-Medrol 40 mg IV every 6 hours  DVT prophylaxis:   Lovenox 40 mg subcutaneous every 24 hours  GI prophylaxis: --  Will follow for further recommendations    Thank You for allowing me to participate in this patient's care     Richy Metz MD      Note to the patient: The 21st Century Cures Act makes medical notes like these available to patients in the interest of transparency. However, be advised that this is a medical document. It is intended as peer to peer communication. It is written in medical language and may contain abbreviations or verbiage that are unfamiliar. It may appear blunt or direct. Medical documents are intended to carry relevant information, facts as evident, and clinical opinion of the practitioner.      Disclaimer: Components of this note were documented using voice recognition system and are subject to errors not corrected at proofreading. Contact the author of this note for any clarifications

## 2024-09-30 NOTE — PLAN OF CARE
Problem: Patient/Family Goals  Goal: Patient/Family Long Term Goal  Description: Patient's Long Term Goal: Discharge with adequate resources    Interventions:  - See additional Care Plan goals for specific interventions  Outcome: Progressing  Goal: Patient/Family Short Term Goal  Description: Patient's Short Term Goal:   9/28 AM: Wean O2 as tolerated  9/28noc: wean O2  9/29 AM: Wean O2 as tolerated  9/29 noc; wean oxygen    Interventions:   - See additional Care Plan goals for specific interventions  Outcome: Progressing     Problem: CARDIOVASCULAR - ADULT  Goal: Maintains optimal cardiac output and hemodynamic stability  Description: INTERVENTIONS:  - Monitor vital signs, rhythm, and trends  - Monitor for bleeding, hypotension and signs of decreased cardiac output  - Evaluate effectiveness of vasoactive medications to optimize hemodynamic stability  - Monitor arterial and/or venous puncture sites for bleeding and/or hematoma  - Assess quality of pulses, skin color and temperature  - Assess for signs of decreased coronary artery perfusion - ex. Angina  - Evaluate fluid balance, assess for edema, trend weights  Outcome: Progressing     Problem: RESPIRATORY - ADULT  Goal: Achieves optimal ventilation and oxygenation  Description: INTERVENTIONS:  - Assess for changes in respiratory status  - Assess for changes in mentation and behavior  - Position to facilitate oxygenation and minimize respiratory effort  - Oxygen supplementation based on oxygen saturation or ABGs  - Provide Smoking Cessation handout, if applicable  - Encourage broncho-pulmonary hygiene including cough, deep breathe, Incentive Spirometry  - Assess the need for suctioning and perform as needed  - Assess and instruct to report SOB or any respiratory difficulty  - Respiratory Therapy support as indicated  - Manage/alleviate anxiety  - Monitor for signs/symptoms of CO2 retention  Outcome: Progressing      Pt attended cardiac rehab phase III. See flowsheet for details.

## 2024-09-30 NOTE — PLAN OF CARE
Aox4. Glasses. 6-8L of O2. Trying to wean as tolerated. IV abx. IV steroids. No tele, . Lovenox. Regular diet. Up self. Patient in bed, call light in reach.    Problem: Patient/Family Goals  Goal: Patient/Family Long Term Goal  Description: Patient's Long Term Goal: Discharge with adequate resources    Interventions:  - See additional Care Plan goals for specific interventions  Outcome: Progressing  Goal: Patient/Family Short Term Goal  Description: Patient's Short Term Goal:   9/28 AM: Wean O2 as tolerated  9/28noc: wean O2  9/29 AM: Wean O2 as tolerated  9/29 noc; wean oxygen  9/30 AM: wean O2 as tolerated    Interventions:   - See additional Care Plan goals for specific interventions  Outcome: Progressing     Problem: CARDIOVASCULAR - ADULT  Goal: Maintains optimal cardiac output and hemodynamic stability  Description: INTERVENTIONS:  - Monitor vital signs, rhythm, and trends  - Monitor for bleeding, hypotension and signs of decreased cardiac output  - Evaluate effectiveness of vasoactive medications to optimize hemodynamic stability  - Monitor arterial and/or venous puncture sites for bleeding and/or hematoma  - Assess quality of pulses, skin color and temperature  - Assess for signs of decreased coronary artery perfusion - ex. Angina  - Evaluate fluid balance, assess for edema, trend weights  Outcome: Progressing     Problem: RESPIRATORY - ADULT  Goal: Achieves optimal ventilation and oxygenation  Description: INTERVENTIONS:  - Assess for changes in respiratory status  - Assess for changes in mentation and behavior  - Position to facilitate oxygenation and minimize respiratory effort  - Oxygen supplementation based on oxygen saturation or ABGs  - Provide Smoking Cessation handout, if applicable  - Encourage broncho-pulmonary hygiene including cough, deep breathe, Incentive Spirometry  - Assess the need for suctioning and perform as needed  - Assess and instruct to report SOB or any respiratory difficulty  -  Respiratory Therapy support as indicated  - Manage/alleviate anxiety  - Monitor for signs/symptoms of CO2 retention  Outcome: Progressing

## 2024-10-01 ENCOUNTER — APPOINTMENT (OUTPATIENT)
Dept: CV DIAGNOSTICS | Facility: HOSPITAL | Age: 39
End: 2024-10-01
Attending: HOSPITALIST
Payer: COMMERCIAL

## 2024-10-01 LAB
M TB IFN-G CD4+ T-CELLS BLD-ACNC: 0.02 IU/ML
M TB TUBERC IGNF/MITOGEN IGNF CONTROL: 0.12 IU/ML
QFT TB1 AG MINUS NIL: -0.02 IU/ML
QFT TB2 AG MINUS NIL: -0.01 IU/ML

## 2024-10-01 PROCEDURE — 93306 TTE W/DOPPLER COMPLETE: CPT | Performed by: HOSPITALIST

## 2024-10-01 PROCEDURE — 99233 SBSQ HOSP IP/OBS HIGH 50: CPT | Performed by: INTERNAL MEDICINE

## 2024-10-01 PROCEDURE — 99232 SBSQ HOSP IP/OBS MODERATE 35: CPT | Performed by: HOSPITALIST

## 2024-10-01 NOTE — PLAN OF CARE
Aox4, vss, afebrile no tele, Q8 vitals, 5-6L throughout night, . Attempting to wean. Tessalon and robitussin per MAR. IV steroid. SB assist. Updated pt on poc for noc. Will follow.   Problem: Patient/Family Goals  Goal: Patient/Family Long Term Goal  Description: Patient's Long Term Goal: Discharge with adequate resources    Interventions:  - See additional Care Plan goals for specific interventions  Outcome: Progressing  Goal: Patient/Family Short Term Goal  Description: Patient's Short Term Goal:   9/28 AM: Wean O2 as tolerated  9/28noc: wean O2  9/29 AM: Wean O2 as tolerated  9/29 noc; wean oxygen  9/29 AM: wean O2 as tolerated    Interventions:   - See additional Care Plan goals for specific interventions  Outcome: Progressing     Problem: CARDIOVASCULAR - ADULT  Goal: Maintains optimal cardiac output and hemodynamic stability  Description: INTERVENTIONS:  - Monitor vital signs, rhythm, and trends  - Monitor for bleeding, hypotension and signs of decreased cardiac output  - Evaluate effectiveness of vasoactive medications to optimize hemodynamic stability  - Monitor arterial and/or venous puncture sites for bleeding and/or hematoma  - Assess quality of pulses, skin color and temperature  - Assess for signs of decreased coronary artery perfusion - ex. Angina  - Evaluate fluid balance, assess for edema, trend weights  Outcome: Progressing     Problem: RESPIRATORY - ADULT  Goal: Achieves optimal ventilation and oxygenation  Description: INTERVENTIONS:  - Assess for changes in respiratory status  - Assess for changes in mentation and behavior  - Position to facilitate oxygenation and minimize respiratory effort  - Oxygen supplementation based on oxygen saturation or ABGs  - Provide Smoking Cessation handout, if applicable  - Encourage broncho-pulmonary hygiene including cough, deep breathe, Incentive Spirometry  - Assess the need for suctioning and perform as needed  - Assess and instruct to report SOB or any  respiratory difficulty  - Respiratory Therapy support as indicated  - Manage/alleviate anxiety  - Monitor for signs/symptoms of CO2 retention  Outcome: Progressing

## 2024-10-01 NOTE — PROGRESS NOTES
[facility]   part of [facility]     Hospitalist Progress Note     Ben Lanza Patient Status:  Inpatient    1985 MRN [id number]   Location [facility] 5NW-A Attending Gabriele Stewart MD   Hosp Day # 5 PCP Larry Pisano MD     Chief Complaint: Pneumonitis    Subjective:     Patient making improvement. On 4L via NC. Pulling 1500+ on IS. Minimal cough/phlegm     Objective:    Review of Systems:   ROS completed; pertinent positive and negatives stated in subjective.    Vital signs:  Temp:  [97 °F (36.1 °C)-97.9 °F (36.6 °C)] 97.7 °F (36.5 °C)  Pulse:  [65-86] 84  Resp:  [18] 18  BP: (118-131)/(69-83) 131/69  SpO2:  [91 %-93 %] 91 %    Physical Exam:    General: No acute distress  Respiratory: Diminished at bases R>L  Cardiovascular: S1, S2.  Abdomen: Soft  Neuro: No new focal deficits    Diagnostic Data:    Labs:  Recent Labs   Lab 24  1149 10/02/24  0616   WBC 9.3 15.6*   HGB 15.7 16.2   MCV 86.5 86.8   .0 397.0       Recent Labs   Lab 24  1149 24  0949 10/02/24  0616   * 218* 157*   BUN 8* 15 16   CREATSERUM 0.90 0.97 0.81   CA 9.6 9.3 9.1   ALB 4.5  --  4.1    137 137   K 3.7 4.1 4.4    105 105   CO2 25.0 23.0 23.0   ALKPHO 87  --  66   AST 30  --  12   ALT 31  --  58*   BILT 0.4  --  0.5   TP 7.8  --  6.8       Estimated Creatinine Clearance: 127.8 mL/min (based on SCr of 0.81 mg/dL).     No results for input(s): \"TROP\", \"TROPHS\", \"CK\" in the last 168 hours.    No results for input(s): \"PTP\", \"INR\" in the last 168 hours.           Imaging: Imaging data reviewed in Epic.    Medications:    methylPREDNISolone  40 mg Intravenous Q6H    albuterol  2.5 mg Nebulization QID    enoxaparin  40 mg Subcutaneous Daily    guaiFENesin ER  1,200 mg Oral BID    benzonatate  200 mg Oral TID    budesonide  0.5 mg Nebulization 2 times daily       Assessment & Plan:     #Acute hypoxic respiratory failure d/t pneumonitis from recent viral infection vs pneumonia,  MRSA nares neg, RVP neg, Legionella neg, ECHO EF: 60-65%  -IV steroids  -Nebs  -Antitussives  -Oxygen - wean as able, currently 4L  -Incentive spirometry, flutter - reviewed with patient   -Will need repeat imaging as outpatient   -Pulmonary consult    #Steroid induced hyperglycemia    Plan of care discussed with patient and RN.    Mike ASHLEY    Supplementary Documentation:     Quality:    DVT Mechanical Prophylaxis:     Early ambuation  DVT Pharmacologic Prophylaxis   Medication    enoxaparin (Lovenox) 40 MG/0.4ML SUBQ injection 40 mg                Code Status: Not on file  Owens: No urinary catheter in place  Owens Duration (in days):   Central line:    ELDER:       The 21st Century Cures Act makes medical notes like these available to patients in the interest of transparency. Please be advised this is a medical document. Medical documents are intended to carry relevant information, facts as evident, and the clinical opinion of the practitioner. The medical note is intended as peer to peer communication and may appear blunt or direct. It is written in medical language and may contain abbreviations or verbiage that are unfamiliar.

## 2024-10-01 NOTE — PAYOR COMM NOTE
CONTINUED STAY REVIEW    Payor: [employer]  Subscriber #:  [id number]  Authorization Number: [id number]    Admit date: 9/27/24  Admit time:  2:15 PM    REVIEW DOCUMENTATION: 9/30/2024 9/30/2024 Hospitalist Progress Note  Chief Complaint: dyspea/cough        Subjective:  Patient feels ok.  Dyspnea a bit better.            Objective:  Review of Systems:   ROS completed; pertinent positive and negatives stated in subjective.     Vital signs:  Temp:  [97.8 °F (36.6 °C)-98.1 °F (36.7 °C)] 98 °F (36.7 °C)  Pulse:  [66-89] 87  Resp:  [18-20] 18  BP: (117-131)/(65-83) 118/65  SpO2:  [92 %-95 %] 95 %     Physical Exam:    General: No acute distress  Respiratory: b/l rhonchi  Cardiovascular: S1, S2.  Abdomen: Soft  Neuro: No new focal deficits        Diagnostic Data:    Labs:      Recent Labs   Lab 09/27/24  1149   WBC 9.3   HGB 15.7   MCV 86.5   .0              Recent Labs   Lab 09/27/24  1149 09/30/24  0949   * 218*   BUN 8* 15   CREATSERUM 0.90 0.97   CA 9.6 9.3   ALB 4.5  --     137   K 3.7 4.1    105   CO2 25.0 23.0   ALKPHO 87  --    AST 30  --    ALT 31  --    BILT 0.4  --    TP 7.8  --          Estimated Creatinine Clearance: 106.7 mL/min (based on SCr of 0.97 mg/dL).      No results for input(s): \"TROP\", \"TROPHS\", \"CK\" in the last 168 hours.     No results for input(s): \"PTP\", \"INR\" in the last 168 hours.           Imaging: Imaging data reviewed in Epic.     Medications:   Scheduled Medications    methylPREDNISolone  40 mg Intravenous Q6H    albuterol  2.5 mg Nebulization QID    enoxaparin  40 mg Subcutaneous Daily    cefTRIAXone  1 g Intravenous Q24H    guaiFENesin ER  1,200 mg Oral BID    benzonatate  200 mg Oral TID    budesonide  0.5 mg Nebulization 2 times daily                  Assessment & Plan:  #Acute Hypoxic resp failure  Cont. O2 and wean as able  CTA:  b/l PNA, no PE     #PNA  Cont. rocephin/azithro  MRSA nares neg, vanco stopped  RVP neg  COVID neg  Legionella  neg  SCX: neg  PCT: 0.1  Will f/u with pulm and have repeat imaging to r/o other underlying pathology     #Bronchospasms  Cont. Steroids  BD protocol  Budesonide nebs     #Steroid hyperglycemia  monitor    Dispo: as above. Discussed with pulm. Remains hypoxic.  Dose of lasix.  Cont. To wean O2 as able     Gabriele Stewart MD    2024 Pulmonology Progress Note     Expand All Collapse All       [facility]  Pulmonary/Critical Care/Sleep Medicine Progress note           Ben Lanza Patient Status:  Inpatient    1985 MRN [id number]   Location [facility] 5NW-A Attending Gabriele Stewart MD   Hosp Day # 3 PCP Larry Pisano MD          Chief Complaint   Patient presents with    Difficulty Breathing         History of Present Illness:  Stable overnight.  Feels better.     History:  Past Medical History       Past Medical History:    Childhood asthma (HCC)         Past Surgical History   History reviewed. No pertinent surgical history.     Family History         Family History   Problem Relation Age of Onset    Hypertension Father      Anxiety Brother            reports that he has quit smoking. His smoking use included cigarettes. He has never used smokeless tobacco. He reports current alcohol use of about 12.0 standard drinks of alcohol per week. He reports that he does not currently use drugs.     Allergies:  Allergies   No Known Allergies        Medications:    Current Hospital Medications      Current Facility-Administered Medications:     temazepam (Restoril) cap 15 mg, 15 mg, Oral, Nightly PRN    methylPREDNISolone sodium succinate (Solu-MEDROL) injection 40 mg, 40 mg, Intravenous, Q6H    albuterol (Ventolin) (2.5 MG/3ML) 0.083% nebulizer solution 2.5 mg, 2.5 mg, Nebulization, QID    acetaminophen (Tylenol Extra Strength) tab 1,000 mg, 1,000 mg, Oral, Q4H PRN    melatonin tab 3 mg, 3 mg, Oral, Nightly PRN    glycerin-hypromellose- (Artificial Tears) 0.2-0.2-1 % ophthalmic solution 1  drop, 1 drop, Both Eyes, QID PRN    sodium chloride (Saline Mist) 0.65 % nasal solution 1 spray, 1 spray, Each Nare, Q3H PRN    enoxaparin (Lovenox) 40 MG/0.4ML SUBQ injection 40 mg, 40 mg, Subcutaneous, Daily    ondansetron (Zofran) 4 MG/2ML injection 4 mg, 4 mg, Intravenous, Q6H PRN    prochlorperazine (Compazine) 10 MG/2ML injection 5 mg, 5 mg, Intravenous, Q8H PRN    polyethylene glycol (PEG 3350) (Miralax) 17 g oral packet 17 g, 17 g, Oral, Daily PRN    sennosides (Senokot) tab 17.2 mg, 17.2 mg, Oral, Nightly PRN    bisacodyl (Dulcolax) 10 MG rectal suppository 10 mg, 10 mg, Rectal, Daily PRN    fleet enema (Fleet) rectal enema 133 mL, 1 enema, Rectal, Once PRN    cefTRIAXone (Rocephin) 1 g in sodium chloride 0.9% 100 mL IVPB-ADDV, 1 g, Intravenous, Q24H    guaiFENesin ER (Mucinex) 12 hr tab 1,200 mg, 1,200 mg, Oral, BID    benzonatate (Tessalon) cap 200 mg, 200 mg, Oral, TID    HYDROcodone-homatropine (Hydromet) 5-1.5 MG/5ML oral syrup 5 mL, 5 mL, Oral, Q4H PRN    budesonide (Pulmicort) 0.5 MG/2ML nebulizer suspension 0.5 mg, 0.5 mg, Nebulization, 2 times daily        Intake/Output:     Intake/Output Summary (Last 24 hours) at 9/30/2024 1511  Last data filed at 9/30/2024 1418      Gross per 24 hour   Intake 360 ml   Output 550 ml   Net -190 ml            Review of Systems     Review of Systems: A comprehensive 10 point review of systems was completed.  Pertinent positives and negatives noted in the the HPI.            Patient Vitals for the past 24 hrs:    BP Temp Temp src Pulse Resp SpO2   09/30/24 1214 118/65 98 °F (36.7 °C) Oral 87 18 95 %   09/30/24 0810 131/80 97.8 °F (36.6 °C) Oral 89 20 94 %   09/30/24 0720 -- -- -- -- -- 94 %   09/30/24 0604 117/74 98.1 °F (36.7 °C) Oral 66 18 92 %   09/29/24 1941 131/83 98 °F (36.7 °C) Oral 81 18 95 %      Vitals          Vitals:     09/30/24 0604 09/30/24 0720 09/30/24 0810 09/30/24 1214   BP: 117/74   131/80 118/65   BP Location: Left arm   Left arm Left arm    Pulse: 66   89 87   Resp: 18   20 18   Temp: 98.1 °F (36.7 °C)   97.8 °F (36.6 °C) 98 °F (36.7 °C)   TempSrc: Oral   Oral Oral   SpO2: 92% 94% 94% 95%   Weight:           Height:                 Body mass index is 32.07 kg/m².      Physical Exam  Constitutional:       General: He is not in acute distress.     Appearance: Normal appearance. He is not ill-appearing or diaphoretic.   HENT:      Head: Normocephalic and atraumatic.      Nose: Nose normal. No congestion or rhinorrhea.      Mouth/Throat:      Mouth: Mucous membranes are moist.      Pharynx: Oropharynx is clear. No oropharyngeal exudate or posterior oropharyngeal erythema.   Eyes:      Extraocular Movements: Extraocular movements intact.      Pupils: Pupils are equal, round, and reactive to light.   Cardiovascular:      Rate and Rhythm: Normal rate.      Pulses: Normal pulses.      Heart sounds: Normal heart sounds. No murmur heard.  Pulmonary:      Effort: Pulmonary effort is normal. No respiratory distress.      Breath sounds: Normal breath sounds. No wheezing or rhonchi.   Chest:      Chest wall: No tenderness.   Abdominal:      General: Abdomen is flat. Bowel sounds are normal.      Palpations: Abdomen is soft.   Musculoskeletal:         General: Normal range of motion.   Skin:     General: Skin is warm.   Neurological:      General: No focal deficit present.      Mental Status: He is alert and oriented to person, place, and time.   Psychiatric:         Mood and Affect: Mood normal.         Behavior: Behavior normal.         Thought Content: Thought content normal.         Judgment: Judgment normal.               Lab Data Review:         Recent Labs   Lab 09/27/24  1149   WBC 9.3   HGB 15.7   HCT 46.1   .0              Recent Labs   Lab 09/27/24  1149 09/30/24  0949    137   K 3.7 4.1    105   CO2 25.0 23.0   BUN 8* 15   CREATSERUM 0.90 0.97   CA 9.6 9.3   ALB 4.5  --    ALKPHO 87  --    ALT 31  --    AST 30  --    * 218*              Recent Labs   Lab 09/30/24  0949   MG 2.2               Lab Results   Component Value Date     PHOS 3.7 09/30/2024         No results for input(s): \"PT\", \"INR\", \"PTT\" in the last 168 hours.         Recent Labs   Lab 09/28/24  1054   ABGPHT 7.50*   GMKRKH9G 32*   FGXEM1V 62*   ABGHCO3 26.7   ABGBE 2.5*   TEMP 98.6   TERRELL Positive   SITE Left Radial   DEV High flow nasal cannula   THGB 15.4         No results for input(s): \"TROP\", \"CKMB\" in the last 168 hours.        Cultures:         Hospital Encounter on 09/27/24   1. Blood Culture     Status: None (Preliminary result)     Collection Time: 09/27/24 11:49 AM     Specimen: Blood,peripheral   Result Value Ref Range     Blood Culture Result No Growth 2 Days N/A              Radiology personally reviewed:  No results found.          Patient Active Problem List   Diagnosis    Hypoxia    Pneumonia of both lungs due to infectious organism, unspecified part of lung    Acute hypoxic respiratory failure (HCC)    Severe persistent asthma with exacerbation (HCC)    Therapeutic drug monitoring      Assessment:  Acute hypoxic respiratory failure suspect related to postviral pneumonitis versus atypical infection: Requiring supplemental oxygen 6 L/min via nasal cannula  Bilateral lower lobe patchy consolidation: Clinically improving  Asthma with acute exacerbation  History of tobacco use        Plan:  Wean FiO2 to keep oxygen saturations between 90% to 92% continue current antibiotics  Budesonide 0.5 mg nebulizers every 12 hours  DuoNebs 4 times daily  Continue current antibiotics  Solu-Medrol 40 mg IV every 6 hours  DVT prophylaxis:   Lovenox 40 mg subcutaneous every 24 hours  GI prophylaxis: --  Will follow for further recommendations     Thank You for allowing me to participate in this patient's care      Richy Metz MD                 MEDICATIONS ADMINISTERED IN LAST 1 DAY:  albuterol (Ventolin) (2.5 MG/3ML) 0.083% nebulizer solution 2.5 mg       Date Action Dose  Route User    10/1/2024 0807 Given 2.5 mg Nebulization Sugey Mae, SCOTT    9/30/2024 1959 Given 2.5 mg Nebulization Salena Rocha RCP    9/30/2024 1534 Given 2.5 mg Nebulization Tashi Saldana SCOTT    9/30/2024 1110 Given 2.5 mg Nebulization Kevin Medina, RCSCOTT          benzonatate (Tessalon) cap 200 mg       Date Action Dose Route User    10/1/2024 0925 Given 200 mg Oral Tala Lowry RN    9/30/2024 2032 Given 200 mg Oral Mary Ann Gross RN    9/30/2024 1405 Given 200 mg Oral Yolanda Hanson RN          budesonide (Pulmicort) 0.5 MG/2ML nebulizer suspension 0.5 mg       Date Action Dose Route User    10/1/2024 0810 Given 0.5 mg Nebulization Sugey Mae SCOTT    9/30/2024 2007 Given by Other 0.5 mg Nebulization Salena Rocha RCP          cefTRIAXone (Rocephin) 1 g in sodium chloride 0.9% 100 mL IVPB-ADDV       Date Action Dose Route User    9/30/2024 1729 New Bag 1 g Intravenous Rita Simms RN          enoxaparin (Lovenox) 40 MG/0.4ML SUBQ injection 40 mg       Date Action Dose Route User    9/30/2024 1729 Given 40 mg Subcutaneous (Right Lower Abdomen) Rita Simms RN          furosemide (Lasix) 10 mg/mL injection 20 mg       Date Action Dose Route User    9/30/2024 1020 Given 20 mg Intravenous Yolanda Hanson RN          guaiFENesin ER (Mucinex) 12 hr tab 1,200 mg       Date Action Dose Route User    9/30/2024 1221 Given 1,200 mg Oral Yolanda Hanson RN          methylPREDNISolone sodium succinate (Solu-MEDROL) injection 40 mg       Date Action Dose Route User    10/1/2024 0924 Given 40 mg Intravenous Tala Lowry RN    10/1/2024 0255 Given 40 mg Intravenous Mary Ann Gross RN    9/30/2024 2033 Given 40 mg Intravenous Mary Ann Gross RN    9/30/2024 1403 Given 40 mg Intravenous Skiple, Yolanda, RN          temazepam (Restoril) cap 15 mg       Date Action Dose Route User    9/30/2024 2032 Given 15 mg Oral Mary Ann Gross, RN            Vitals (last day)       Date/Time Temp Pulse Resp BP SpO2  Weight O2 Device O2 Flow Rate (L/min) [facility]    10/01/24 0852 97.7 °F (36.5 °C) 85 18 132/85 90 % -- -- 6 L/min HB    10/01/24 0807 -- -- -- -- 93 % -- High flow nasal cannula 6 L/min DG    10/01/24 0644 -- -- -- -- -- -- -- 6 L/min VH    10/01/24 0515 -- 71 -- 118/79 90 % -- -- -- VH    09/30/24 2035 97.8 °F (36.6 °C) 97 18 129/80 91 % -- High flow nasal cannula 6 L/min AW    09/30/24 2013 -- -- -- -- -- -- High flow/High humidity 6 L/min MO    09/30/24 1630 97.9 °F (36.6 °C) 91 20 128/74 93 % -- High flow nasal cannula 6 L/min     09/30/24 1214 98 °F (36.7 °C) 87 18 118/65 95 % -- High flow nasal cannula 8 L/min     09/30/24 0810 97.8 °F (36.6 °C) 89 20 131/80 94 % -- High flow nasal cannula 8 L/min     09/30/24 0720 -- -- -- -- 94 % -- -- 8 L/min MB    09/30/24 0604 98.1 °F (36.7 °C) 66 18 117/74 92 % -- -- -- AW          CIWA Scores (since admission)       None

## 2024-10-01 NOTE — PROGRESS NOTES
[facility]   part of [facility]     Hospitalist Progress Note     Ben Lanza Patient Status:  Inpatient    1985 MRN [id number]   Location [facility] 5NW-A Attending Gabriele Setwart MD   Hosp Day # 4 PCP Larry Pisano MD     Chief Complaint: dyspea/cough    Subjective:     Patient feels about the same.  O2 req a bit lower today    Objective:    Review of Systems:   ROS completed; pertinent positive and negatives stated in subjective.    Vital signs:  Temp:  [97.7 °F (36.5 °C)-97.9 °F (36.6 °C)] 97.7 °F (36.5 °C)  Pulse:  [71-97] 85  Resp:  [18-20] 18  BP: (118-132)/(74-85) 132/85  SpO2:  [90 %-93 %] 90 %    Physical Exam:    General: No acute distress  Respiratory: b/l rhonchi  Cardiovascular: S1, S2.  Abdomen: Soft  Neuro: No new focal deficits      Diagnostic Data:    Labs:  Recent Labs   Lab 24  1149   WBC 9.3   HGB 15.7   MCV 86.5   .0       Recent Labs   Lab 24  1149 24  0949   * 218*   BUN 8* 15   CREATSERUM 0.90 0.97   CA 9.6 9.3   ALB 4.5  --     137   K 3.7 4.1    105   CO2 25.0 23.0   ALKPHO 87  --    AST 30  --    ALT 31  --    BILT 0.4  --    TP 7.8  --        Estimated Creatinine Clearance: 106.7 mL/min (based on SCr of 0.97 mg/dL).     No results for input(s): \"TROP\", \"TROPHS\", \"CK\" in the last 168 hours.    No results for input(s): \"PTP\", \"INR\" in the last 168 hours.           Imaging: Imaging data reviewed in Epic.    Medications:    methylPREDNISolone  40 mg Intravenous Q6H    albuterol  2.5 mg Nebulization QID    enoxaparin  40 mg Subcutaneous Daily    cefTRIAXone  1 g Intravenous Q24H    guaiFENesin ER  1,200 mg Oral BID    benzonatate  200 mg Oral TID    budesonide  0.5 mg Nebulization 2 times daily       Assessment & Plan:     #Acute Hypoxic resp failure  Cont. O2 and wean as able  CTA:  b/l PNA, no PE  Suspect due to pneumonitis from recent viral infection  Echo: EF: 60-65%  Cont. High dose steroids, inhalers,  nebs    #PNA  Cont. Rocephin  Completed azithro  MRSA nares neg, vanco stopped  RVP neg  COVID neg  Legionella neg  SCX: neg  PCT: 0.1  Will f/u with pulm and have repeat imaging to r/o other underlying pathology      #Steroid induced hyperglycemia  Monitor, insulin if persisently >180      Dispo: as above.       Gabriele Stewart MD    Supplementary Documentation:     Quality:    DVT Mechanical Prophylaxis:     Early ambuation  DVT Pharmacologic Prophylaxis   Medication    enoxaparin (Lovenox) 40 MG/0.4ML SUBQ injection 40 mg                Code Status: Not on file  Owens: No urinary catheter in place  Owens Duration (in days):   Central line:    ELDER:       Discharge is dependent on: clinical stability  At this point Mr. Lanza is expected to be discharge to: home    The 21st Century Cures Act makes medical notes like these available to patients in the interest of transparency. Please be advised this is a medical document. Medical documents are intended to carry relevant information, facts as evident, and the clinical opinion of the practitioner. The medical note is intended as peer to peer communication and may appear blunt or direct. It is written in medical language and may contain abbreviations or verbiage that are unfamiliar.

## 2024-10-01 NOTE — PROGRESS NOTES
[facility]  Pulmonary/Critical Care/Sleep Medicine Progress note    Ben Lanza Patient Status:  Inpatient    1985 MRN [id number]   Location [facility] 5NW-A Attending Gabriele Stewart MD   Hosp Day # 4 PCP Larry Pisano MD     Chief Complaint   Patient presents with    Difficulty Breathing        History of Present Illness:  Stable overnight.  Continues to require FiO2 6 L/min by high flow nasal cannula.  Belittled feels better.    History:  Past Medical History:    Childhood asthma (HCC)     History reviewed. No pertinent surgical history.  Family History   Problem Relation Age of Onset    Hypertension Father     Anxiety Brother       reports that he has quit smoking. His smoking use included cigarettes. He has never used smokeless tobacco. He reports current alcohol use of about 12.0 standard drinks of alcohol per week. He reports that he does not currently use drugs.    Allergies:  No Known Allergies    Medications:    Current Facility-Administered Medications:     temazepam (Restoril) cap 15 mg, 15 mg, Oral, Nightly PRN    methylPREDNISolone sodium succinate (Solu-MEDROL) injection 40 mg, 40 mg, Intravenous, Q6H    albuterol (Ventolin) (2.5 MG/3ML) 0.083% nebulizer solution 2.5 mg, 2.5 mg, Nebulization, QID    acetaminophen (Tylenol Extra Strength) tab 1,000 mg, 1,000 mg, Oral, Q4H PRN    melatonin tab 3 mg, 3 mg, Oral, Nightly PRN    glycerin-hypromellose- (Artificial Tears) 0.2-0.2-1 % ophthalmic solution 1 drop, 1 drop, Both Eyes, QID PRN    sodium chloride (Saline Mist) 0.65 % nasal solution 1 spray, 1 spray, Each Nare, Q3H PRN    enoxaparin (Lovenox) 40 MG/0.4ML SUBQ injection 40 mg, 40 mg, Subcutaneous, Daily    ondansetron (Zofran) 4 MG/2ML injection 4 mg, 4 mg, Intravenous, Q6H PRN    prochlorperazine (Compazine) 10 MG/2ML injection 5 mg, 5 mg, Intravenous, Q8H PRN    polyethylene glycol (PEG 3350) (Miralax) 17 g oral packet 17 g, 17 g, Oral, Daily PRN    sennosides  (Senokot) tab 17.2 mg, 17.2 mg, Oral, Nightly PRN    bisacodyl (Dulcolax) 10 MG rectal suppository 10 mg, 10 mg, Rectal, Daily PRN    fleet enema (Fleet) rectal enema 133 mL, 1 enema, Rectal, Once PRN    cefTRIAXone (Rocephin) 1 g in sodium chloride 0.9% 100 mL IVPB-ADDV, 1 g, Intravenous, Q24H    guaiFENesin ER (Mucinex) 12 hr tab 1,200 mg, 1,200 mg, Oral, BID    benzonatate (Tessalon) cap 200 mg, 200 mg, Oral, TID    HYDROcodone-homatropine (Hydromet) 5-1.5 MG/5ML oral syrup 5 mL, 5 mL, Oral, Q4H PRN    budesonide (Pulmicort) 0.5 MG/2ML nebulizer suspension 0.5 mg, 0.5 mg, Nebulization, 2 times daily    Intake/Output:    Intake/Output Summary (Last 24 hours) at 10/1/2024 1331  Last data filed at 9/30/2024 1418  Gross per 24 hour   Intake 240 ml   Output --   Net 240 ml          Review of Systems    Review of Systems: A comprehensive 10 point review of systems was completed.  Pertinent positives and negatives noted in the the HPI.         Patient Vitals for the past 24 hrs:   BP Temp Temp src Pulse Resp SpO2   09/30/24 1214 118/65 98 °F (36.7 °C) Oral 87 18 95 %   09/30/24 0810 131/80 97.8 °F (36.6 °C) Oral 89 20 94 %   09/30/24 0720 -- -- -- -- -- 94 %   09/30/24 0604 117/74 98.1 °F (36.7 °C) Oral 66 18 92 %   09/29/24 1941 131/83 98 °F (36.7 °C) Oral 81 18 95 %     Vitals:    09/30/24 2035 10/01/24 0515 10/01/24 0807 10/01/24 0852   BP: 129/80 118/79  132/85   BP Location: Left arm      Pulse: 97 71  85   Resp: 18   18   Temp: 97.8 °F (36.6 °C)   97.7 °F (36.5 °C)   TempSrc: Oral      SpO2: 91% 90% 93% 90%   Weight:       Height:          Body mass index is 32.07 kg/m².     Physical Exam  Constitutional:       General: He is not in acute distress.     Appearance: Normal appearance. He is not ill-appearing or diaphoretic.   HENT:      Head: Normocephalic and atraumatic.      Nose: Nose normal. No congestion or rhinorrhea.      Mouth/Throat:      Mouth: Mucous membranes are moist.      Pharynx: Oropharynx is clear.  No oropharyngeal exudate or posterior oropharyngeal erythema.   Eyes:      Extraocular Movements: Extraocular movements intact.      Pupils: Pupils are equal, round, and reactive to light.   Cardiovascular:      Rate and Rhythm: Normal rate.      Pulses: Normal pulses.      Heart sounds: Normal heart sounds. No murmur heard.  Pulmonary:      Effort: Pulmonary effort is normal. No respiratory distress.      Breath sounds: Normal breath sounds. No wheezing or rhonchi.      Comments: Crackles at left lung base  Chest:      Chest wall: No tenderness.   Abdominal:      General: Abdomen is flat. Bowel sounds are normal.      Palpations: Abdomen is soft.   Musculoskeletal:         General: Normal range of motion.   Skin:     General: Skin is warm.   Neurological:      General: No focal deficit present.      Mental Status: He is alert and oriented to person, place, and time.   Psychiatric:         Mood and Affect: Mood normal.         Behavior: Behavior normal.         Thought Content: Thought content normal.         Judgment: Judgment normal.           Lab Data Review:    Recent Labs   Lab 09/27/24  1149   WBC 9.3   HGB 15.7   HCT 46.1   .0       Recent Labs   Lab 09/27/24  1149 09/30/24  0949    137   K 3.7 4.1    105   CO2 25.0 23.0   BUN 8* 15   CREATSERUM 0.90 0.97   CA 9.6 9.3   ALB 4.5  --    ALKPHO 87  --    ALT 31  --    AST 30  --    * 218*       Recent Labs   Lab 09/30/24  0949   MG 2.2       Lab Results   Component Value Date    PHOS 3.7 09/30/2024        No results for input(s): \"PT\", \"INR\", \"PTT\" in the last 168 hours.    Recent Labs   Lab 09/28/24  1054   ABGPHT 7.50*   EWVUQW1Y 32*   KFAJW0B 62*   ABGHCO3 26.7   ABGBE 2.5*   TEMP 98.6   TERRELL Positive   SITE Left Radial   DEV High flow nasal cannula   THGB 15.4       No results for input(s): \"TROP\", \"CKMB\" in the last 168 hours.      Cultures:   Hospital Encounter on 09/27/24   1. Blood Culture     Status: None (Preliminary result)     Collection Time: 09/27/24 11:49 AM    Specimen: Blood,peripheral   Result Value Ref Range    Blood Culture Result No Growth 3 Days N/A            Radiology personally reviewed:  No results found.     Patient Active Problem List   Diagnosis    Hypoxia    Pneumonia of both lungs due to infectious organism, unspecified part of lung    Acute hypoxic respiratory failure (HCC)    Severe persistent asthma with exacerbation (HCC)    Therapeutic drug monitoring     Assessment:  Acute hypoxic respiratory failure suspect related to postviral pneumonitis versus atypical infection: Requiring supplemental oxygen 6 L/min via high flow nasal cannula  Bilateral lower lobe patchy consolidation in recent influenza infection: With bilateral upper lobe patchy groundglass/mosaic attenuation density suggesting multifocal pneumonia though cannot rule out interstitial lung disease, strep pneumonia antigen negative ESR was elevated, QuantiFERON TB Gold test was negative, respiratory expanded to panel with COVID was negative including mycoplasma and chlamydia PCR, Legionella urinary antigen negative,:.  Clinically improving  Shortness of breath  Asthma with acute exacerbation  History of tobacco use      Plan:  Wean FiO2 to keep oxygen saturations between 90% to 92% continue current antibiotics  Budesonide 0.5 mg nebulizers every 12 hours  DuoNebs 4 times daily  Continue current antibiotics  Continue Solu-Medrol 40 mg IV every 6 hours  DVT prophylaxis:   Lovenox 40 mg subcutaneous every 24 hours  GI prophylaxis: --  Will follow for further recommendations    Thank You for allowing me to participate in this patient's care     Richy Metz MD      Note to the patient: The 21st Century Cures Act makes medical notes like these available to patients in the interest of transparency. However, be advised that this is a medical document. It is intended as peer to peer communication. It is written in medical language and may contain abbreviations  or verbiage that are unfamiliar. It may appear blunt or direct. Medical documents are intended to carry relevant information, facts as evident, and clinical opinion of the practitioner.      Disclaimer: Components of this note were documented using voice recognition system and are subject to errors not corrected at proofreading. Contact the author of this note for any clarifications

## 2024-10-01 NOTE — PLAN OF CARE
Received pt on NC 6L. Saturating well. Vital signs stable. Medications administered per MAR. Pt is A and O x4. Up independently in room. Continent. PIV leaking, so changed. Pt updated on plan of care.     Problem: CARDIOVASCULAR - ADULT  Goal: Maintains optimal cardiac output and hemodynamic stability  Description: INTERVENTIONS:  - Monitor vital signs, rhythm, and trends  - Monitor for bleeding, hypotension and signs of decreased cardiac output  - Evaluate effectiveness of vasoactive medications to optimize hemodynamic stability  - Monitor arterial and/or venous puncture sites for bleeding and/or hematoma  - Assess quality of pulses, skin color and temperature  - Assess for signs of decreased coronary artery perfusion - ex. Angina  - Evaluate fluid balance, assess for edema, trend weights  Outcome: Progressing     Problem: RESPIRATORY - ADULT  Goal: Achieves optimal ventilation and oxygenation  Description: INTERVENTIONS:  - Assess for changes in respiratory status  - Assess for changes in mentation and behavior  - Position to facilitate oxygenation and minimize respiratory effort  - Oxygen supplementation based on oxygen saturation or ABGs  - Provide Smoking Cessation handout, if applicable  - Encourage broncho-pulmonary hygiene including cough, deep breathe, Incentive Spirometry  - Assess the need for suctioning and perform as needed  - Assess and instruct to report SOB or any respiratory difficulty  - Respiratory Therapy support as indicated  - Manage/alleviate anxiety  - Monitor for signs/symptoms of CO2 retention  Outcome: Progressing

## 2024-10-02 PROBLEM — J96.01 ACUTE HYPOXEMIC RESPIRATORY FAILURE (HCC): Status: ACTIVE | Noted: 2024-09-28

## 2024-10-02 LAB
ALBUMIN SERPL-MCNC: 4.1 G/DL (ref 3.2–4.8)
ALBUMIN/GLOB SERPL: 1.5 {RATIO} (ref 1–2)
ALP LIVER SERPL-CCNC: 66 U/L
ALT SERPL-CCNC: 58 U/L
ANION GAP SERPL CALC-SCNC: 9 MMOL/L (ref 0–18)
AST SERPL-CCNC: 12 U/L (ref ?–34)
BASOPHILS # BLD AUTO: 0.03 X10(3) UL (ref 0–0.2)
BASOPHILS NFR BLD AUTO: 0.2 %
BILIRUB SERPL-MCNC: 0.5 MG/DL (ref 0.3–1.2)
BUN BLD-MCNC: 16 MG/DL (ref 9–23)
CALCIUM BLD-MCNC: 9.1 MG/DL (ref 8.7–10.4)
CHLORIDE SERPL-SCNC: 105 MMOL/L (ref 98–112)
CO2 SERPL-SCNC: 23 MMOL/L (ref 21–32)
CREAT BLD-MCNC: 0.81 MG/DL
EGFRCR SERPLBLD CKD-EPI 2021: 114 ML/MIN/1.73M2 (ref 60–?)
EOSINOPHIL # BLD AUTO: 0 X10(3) UL (ref 0–0.7)
EOSINOPHIL NFR BLD AUTO: 0 %
ERYTHROCYTE [DISTWIDTH] IN BLOOD BY AUTOMATED COUNT: 13.2 %
GLOBULIN PLAS-MCNC: 2.7 G/DL (ref 2–3.5)
GLUCOSE BLD-MCNC: 157 MG/DL (ref 70–99)
HCT VFR BLD AUTO: 48 %
HGB BLD-MCNC: 16.2 G/DL
IMM GRANULOCYTES # BLD AUTO: 0.45 X10(3) UL (ref 0–1)
IMM GRANULOCYTES NFR BLD: 2.9 %
LYMPHOCYTES # BLD AUTO: 0.82 X10(3) UL (ref 1–4)
LYMPHOCYTES NFR BLD AUTO: 5.3 %
MCH RBC QN AUTO: 29.3 PG (ref 26–34)
MCHC RBC AUTO-ENTMCNC: 33.8 G/DL (ref 31–37)
MCV RBC AUTO: 86.8 FL
MONOCYTES # BLD AUTO: 0.45 X10(3) UL (ref 0.1–1)
MONOCYTES NFR BLD AUTO: 2.9 %
NEUTROPHILS # BLD AUTO: 13.85 X10 (3) UL (ref 1.5–7.7)
NEUTROPHILS # BLD AUTO: 13.85 X10(3) UL (ref 1.5–7.7)
NEUTROPHILS NFR BLD AUTO: 88.7 %
NT-PROBNP SERPL-MCNC: <35 PG/ML (ref ?–125)
OSMOLALITY SERPL CALC.SUM OF ELEC: 288 MOSM/KG (ref 275–295)
PLATELET # BLD AUTO: 397 10(3)UL (ref 150–450)
POTASSIUM SERPL-SCNC: 4.4 MMOL/L (ref 3.5–5.1)
PROT SERPL-MCNC: 6.8 G/DL (ref 5.7–8.2)
RBC # BLD AUTO: 5.53 X10(6)UL
SODIUM SERPL-SCNC: 137 MMOL/L (ref 136–145)
WBC # BLD AUTO: 15.6 X10(3) UL (ref 4–11)

## 2024-10-02 PROCEDURE — 99233 SBSQ HOSP IP/OBS HIGH 50: CPT | Performed by: INTERNAL MEDICINE

## 2024-10-02 PROCEDURE — 99232 SBSQ HOSP IP/OBS MODERATE 35: CPT | Performed by: HOSPITALIST

## 2024-10-02 RX ORDER — METHYLPREDNISOLONE SODIUM SUCCINATE 40 MG/ML
40 INJECTION, POWDER, LYOPHILIZED, FOR SOLUTION INTRAMUSCULAR; INTRAVENOUS EVERY 12 HOURS
Status: DISCONTINUED | OUTPATIENT
Start: 2024-10-03 | End: 2024-10-04

## 2024-10-02 NOTE — PLAN OF CARE
A&Ox4.  VSS on 4L Hi-flow.  SPO2 in low 90s.  Pt denies SOB or pain.      Problem: CARDIOVASCULAR - ADULT  Goal: Maintains optimal cardiac output and hemodynamic stability  Description: INTERVENTIONS:  - Monitor vital signs, rhythm, and trends  - Monitor for bleeding, hypotension and signs of decreased cardiac output  - Evaluate effectiveness of vasoactive medications to optimize hemodynamic stability  - Monitor arterial and/or venous puncture sites for bleeding and/or hematoma  - Assess quality of pulses, skin color and temperature  - Assess for signs of decreased coronary artery perfusion - ex. Angina  - Evaluate fluid balance, assess for edema, trend weights  Outcome: Progressing     Problem: RESPIRATORY - ADULT  Goal: Achieves optimal ventilation and oxygenation  Description: INTERVENTIONS:  - Assess for changes in respiratory status  - Assess for changes in mentation and behavior  - Position to facilitate oxygenation and minimize respiratory effort  - Oxygen supplementation based on oxygen saturation or ABGs  - Provide Smoking Cessation handout, if applicable  - Encourage broncho-pulmonary hygiene including cough, deep breathe, Incentive Spirometry  - Assess the need for suctioning and perform as needed  - Assess and instruct to report SOB or any respiratory difficulty  - Respiratory Therapy support as indicated  - Manage/alleviate anxiety  - Monitor for signs/symptoms of CO2 retention  Outcome: Progressing

## 2024-10-02 NOTE — SPIRITUAL CARE NOTE
Spiritual Care Visit Note    Patient Name: Ben Lanza Date of Spiritual Care Visit: 10/02/24   : 1985 Primary Dx: Hypoxia       Referred By:      Spiritual Care Taxonomy:    Intended Effects: Convey a calming presence    Methods: Explore spiritual/Shinto beliefs;Collaborate with care team member;Offer spiritual/Shinto support    Interventions: Active listening;Explain  role;Ask guided questions about the nature and presence of God    Visit Type/Summary:     - Spiritual Care: Consulted with RN prior to visit. Offered empathic listening and emotional support. Provided information regarding how to contact Spiritual Care and left a Spiritual Care information card.  remains available as needed for follow up.  Patient said \"I am a [employer]arsani and is supported by his wife. I had a family remember who is very Shinto and work in a [employer], whom comes and checks on me\".     Spiritual Care support can be requested via an Epic consult. For urgent/immediate needs, please contact the On Call  at: Edward: ext [phone]         Resident  Eloina Hodges MA

## 2024-10-02 NOTE — PROGRESS NOTES
[facility]  Pulmonary/Critical Care/Sleep Medicine Progress note    Ben Lanza Patient Status:  Inpatient    1985 MRN [id number]   Location [facility] 5NW-A Attending Gabriele Stewart MD   Hosp Day # 5 PCP Larry Pisano MD     Chief Complaint   Patient presents with    Difficulty Breathing        History of Present Illness:    FiO2 weaned to 4 L/min by nasal cannula.  Feeling much better overall but still with some cough    History:  Past Medical History:    Childhood asthma (HCC)     History reviewed. No pertinent surgical history.  Family History   Problem Relation Age of Onset    Hypertension Father     Anxiety Brother       reports that he has quit smoking. His smoking use included cigarettes. He has never used smokeless tobacco. He reports current alcohol use of about 12.0 standard drinks of alcohol per week. He reports that he does not currently use drugs.    Allergies:  No Known Allergies    Medications:    Current Facility-Administered Medications:     temazepam (Restoril) cap 15 mg, 15 mg, Oral, Nightly PRN    methylPREDNISolone sodium succinate (Solu-MEDROL) injection 40 mg, 40 mg, Intravenous, Q6H    albuterol (Ventolin) (2.5 MG/3ML) 0.083% nebulizer solution 2.5 mg, 2.5 mg, Nebulization, QID    acetaminophen (Tylenol Extra Strength) tab 1,000 mg, 1,000 mg, Oral, Q4H PRN    melatonin tab 3 mg, 3 mg, Oral, Nightly PRN    glycerin-hypromellose- (Artificial Tears) 0.2-0.2-1 % ophthalmic solution 1 drop, 1 drop, Both Eyes, QID PRN    sodium chloride (Saline Mist) 0.65 % nasal solution 1 spray, 1 spray, Each Nare, Q3H PRN    enoxaparin (Lovenox) 40 MG/0.4ML SUBQ injection 40 mg, 40 mg, Subcutaneous, Daily    ondansetron (Zofran) 4 MG/2ML injection 4 mg, 4 mg, Intravenous, Q6H PRN    prochlorperazine (Compazine) 10 MG/2ML injection 5 mg, 5 mg, Intravenous, Q8H PRN    polyethylene glycol (PEG 3350) (Miralax) 17 g oral packet 17 g, 17 g, Oral, Daily PRN    sennosides (Senokot)  tab 17.2 mg, 17.2 mg, Oral, Nightly PRN    bisacodyl (Dulcolax) 10 MG rectal suppository 10 mg, 10 mg, Rectal, Daily PRN    fleet enema (Fleet) rectal enema 133 mL, 1 enema, Rectal, Once PRN    guaiFENesin ER (Mucinex) 12 hr tab 1,200 mg, 1,200 mg, Oral, BID    benzonatate (Tessalon) cap 200 mg, 200 mg, Oral, TID    HYDROcodone-homatropine (Hydromet) 5-1.5 MG/5ML oral syrup 5 mL, 5 mL, Oral, Q4H PRN    budesonide (Pulmicort) 0.5 MG/2ML nebulizer suspension 0.5 mg, 0.5 mg, Nebulization, 2 times daily    Intake/Output:  No intake or output data in the 24 hours ending 10/02/24 1251         Review of Systems    Review of Systems: A comprehensive 10 point review of systems was completed.  Pertinent positives and negatives noted in the the HPI.         Patient Vitals for the past 24 hrs:   BP Temp Temp src Pulse Resp SpO2   09/30/24 1214 118/65 98 °F (36.7 °C) Oral 87 18 95 %   09/30/24 0810 131/80 97.8 °F (36.6 °C) Oral 89 20 94 %   09/30/24 0720 -- -- -- -- -- 94 %   09/30/24 0604 117/74 98.1 °F (36.7 °C) Oral 66 18 92 %   09/29/24 1941 131/83 98 °F (36.7 °C) Oral 81 18 95 %     Vitals:    10/01/24 2016 10/02/24 0636 10/02/24 0750 10/02/24 1131   BP: 128/78 118/70  131/69   BP Location: Left arm Left arm  Left arm   Pulse: 84 65  84   Resp: 18 18  18   Temp: 97.9 °F (36.6 °C) 97.8 °F (36.6 °C)  97.7 °F (36.5 °C)   TempSrc: Oral Oral  Oral   SpO2: 92% 92% 92% 91%   Weight:       Height:          Body mass index is 32.07 kg/m².     Physical Exam  Constitutional:       General: He is not in acute distress.     Appearance: Normal appearance. He is not ill-appearing or diaphoretic.   HENT:      Head: Normocephalic and atraumatic.      Nose: Nose normal. No congestion or rhinorrhea.      Mouth/Throat:      Mouth: Mucous membranes are moist.      Pharynx: Oropharynx is clear. No oropharyngeal exudate or posterior oropharyngeal erythema.   Eyes:      Extraocular Movements: Extraocular movements intact.      Pupils: Pupils are  equal, round, and reactive to light.   Cardiovascular:      Rate and Rhythm: Normal rate.      Pulses: Normal pulses.      Heart sounds: Normal heart sounds. No murmur heard.  Pulmonary:      Effort: Pulmonary effort is normal. No respiratory distress.      Breath sounds: Normal breath sounds. No wheezing or rhonchi.      Comments: Crackles at right lower lobe zones greater than left lung base but with improved air entry  Chest:      Chest wall: No tenderness.   Abdominal:      General: Abdomen is flat. Bowel sounds are normal.      Palpations: Abdomen is soft.   Musculoskeletal:         General: Normal range of motion.   Skin:     General: Skin is warm.   Neurological:      General: No focal deficit present.      Mental Status: He is alert and oriented to person, place, and time.   Psychiatric:         Mood and Affect: Mood normal.         Behavior: Behavior normal.         Thought Content: Thought content normal.         Judgment: Judgment normal.           Lab Data Review:    Recent Labs   Lab 09/27/24  1149 10/02/24  0616   WBC 9.3 15.6*   HGB 15.7 16.2   HCT 46.1 48.0   .0 397.0       Recent Labs   Lab 09/27/24  1149 09/30/24  0949 10/02/24  0616    137 137   K 3.7 4.1 4.4    105 105   CO2 25.0 23.0 23.0   BUN 8* 15 16   CREATSERUM 0.90 0.97 0.81   CA 9.6 9.3 9.1   ALB 4.5  --  4.1   ALKPHO 87  --  66   ALT 31  --  58*   AST 30  --  12   * 218* 157*       Recent Labs   Lab 09/30/24  0949   MG 2.2       Lab Results   Component Value Date    PHOS 3.7 09/30/2024        No results for input(s): \"PT\", \"INR\", \"PTT\" in the last 168 hours.    Recent Labs   Lab 09/28/24  1054   ABGPHT 7.50*   VFZBYV1Y 32*   ONMZG2N 62*   ABGHCO3 26.7   ABGBE 2.5*   TEMP 98.6   TERRELL Positive   SITE Left Radial   DEV High flow nasal cannula   THGB 15.4       No results for input(s): \"TROP\", \"CKMB\" in the last 168 hours.      Cultures:   Hospital Encounter on 09/27/24   1. Blood Culture     Status: None  (Preliminary result)    Collection Time: 24 11:49 AM    Specimen: Blood,peripheral   Result Value Ref Range    Blood Culture Result No Growth 4 Days N/A        Transthoracic Echocardiogram     Name:Ben Lanza     Date: 10/01/2024 :  1985 Ht:  (71in)  BP: 132 / 85   MRN:  [id number]     Age:  39years    Wt:  (229lb) HR: 85bpm   Loc:  [facility]       Gndr: M          BSA: 2.23m^2   Sonographer: LAUREN Garza MS     Ordering:    Gabriele Stewart     ----------------------------------------------------------------------------   History/Indications: Hypoxia.     ----------------------------------------------------------------------------   Procedure information:  A transthoracic complete 2D study was performed.   Additional evaluation included M-mode, complete spectral Doppler, and color   Doppler.  Patient status:  Inpatient.  Location:  [place] [address].    No prior   study was available for comparison.    This was a routine study.   Transthoracic echocardiography for ventricular function evaluation and   assessment of valvular function. Image quality was adequate.     ECG rhythm:   Normal sinus     ----------------------------------------------------------------------------     Conclusions:     1. Left ventricle: The cavity size was normal. Wall thickness was normal.      Systolic function was normal. The estimated ejection fraction was 60-65%,      by visual assessment. No diagnostic evidence for regional wall motion      abnormalities.   2. Right ventricle: The cavity size was normal. Systolic function was      normal.   3. No significant valvular heart disease (stenosis or regurgitation)   Impressions:  No previous study from [facility] was   available for comparison.     Radiology personally reviewed:  No results found.     Patient Active Problem List   Diagnosis    Hypoxia    Pneumonia of both lungs due to infectious organism, unspecified part of lung    Acute hypoxemic respiratory failure (HCC)     Severe persistent asthma with exacerbation (HCC)    Therapeutic drug monitoring     Assessment:  Acute hypoxic respiratory failure suspect related to postviral pneumonitis versus atypical infection: FiO2 weaned to 4 L/min via high flow nasal cannula  Bilateral lower lobe patchy consolidation in recent influenza infection: With bilateral upper lobe patchy groundglass/mosaic attenuation density suggesting multifocal pneumonia though cannot rule out interstitial lung disease, strep pneumonia antigen negative ESR was elevated, QuantiFERON TB Gold test was negative, respiratory expanded to panel with COVID was negative including mycoplasma and chlamydia PCR, Legionella urinary antigen negative,: proBNP normal range and echocardiogram showed adequate left ventricular ejection fraction..  Clinically improving  Shortness of breath  Asthma with acute exacerbation  History of tobacco use      Plan:  Wean FiO2 to keep oxygen saturations between 90% to 92% continue current antibiotics  Budesonide 0.5 mg nebulizers every 12 hours  DuoNebs 4 times daily  Continue current antibiotics  Reduce Solu-Medrol 40 mg IV every 12 hours  DVT prophylaxis:   Lovenox 40 mg subcutaneous every 24 hours  GI prophylaxis: --  Will follow for further recommendations  Follow labs, CRP, chest x-ray in a.m.    Thank You for allowing me to participate in this patient's care     Richy Metz MD      Note to the patient: The 21st Century Cures Act makes medical notes like these available to patients in the interest of transparency. However, be advised that this is a medical document. It is intended as peer to peer communication. It is written in medical language and may contain abbreviations or verbiage that are unfamiliar. It may appear blunt or direct. Medical documents are intended to carry relevant information, facts as evident, and clinical opinion of the practitioner.      Disclaimer: Components of this note were documented using voice  recognition system and are subject to errors not corrected at proofreading. Contact the author of this note for any clarifications

## 2024-10-02 NOTE — PLAN OF CARE
Alert x4. 4LNC, weaning as pt tolerates to baseline RA. Voids. Up ad alex. Denies any c/o pain. No n/v/d. IV abx. IV steroids. PRN melatonin and temazepam given per pt request to aid with sleep. Updated on POC, no questions or concerns at this time. Verbalized understanding. Call light within reach    Problem: Patient/Family Goals  Goal: Patient/Family Long Term Goal  Description: Patient's Long Term Goal: Discharge with adequate resources    Interventions:  - See additional Care Plan goals for specific interventions  Outcome: Progressing  Goal: Patient/Family Short Term Goal  Description: Patient's Short Term Goal:   9/28 AM: Wean O2 as tolerated  9/28noc: wean O2  9/29 AM: Wean O2 as tolerated  9/29 noc; wean oxygen  9/29 AM: wean O2 as tolerated    Interventions:   - See additional Care Plan goals for specific interventions  Outcome: Progressing     Problem: CARDIOVASCULAR - ADULT  Goal: Maintains optimal cardiac output and hemodynamic stability  Description: INTERVENTIONS:  - Monitor vital signs, rhythm, and trends  - Monitor for bleeding, hypotension and signs of decreased cardiac output  - Evaluate effectiveness of vasoactive medications to optimize hemodynamic stability  - Monitor arterial and/or venous puncture sites for bleeding and/or hematoma  - Assess quality of pulses, skin color and temperature  - Assess for signs of decreased coronary artery perfusion - ex. Angina  - Evaluate fluid balance, assess for edema, trend weights  Outcome: Progressing     Problem: RESPIRATORY - ADULT  Goal: Achieves optimal ventilation and oxygenation  Description: INTERVENTIONS:  - Assess for changes in respiratory status  - Assess for changes in mentation and behavior  - Position to facilitate oxygenation and minimize respiratory effort  - Oxygen supplementation based on oxygen saturation or ABGs  - Provide Smoking Cessation handout, if applicable  - Encourage broncho-pulmonary hygiene including cough, deep breathe,  Incentive Spirometry  - Assess the need for suctioning and perform as needed  - Assess and instruct to report SOB or any respiratory difficulty  - Respiratory Therapy support as indicated  - Manage/alleviate anxiety  - Monitor for signs/symptoms of CO2 retention  Outcome: Progressing

## 2024-10-03 ENCOUNTER — APPOINTMENT (OUTPATIENT)
Dept: GENERAL RADIOLOGY | Facility: HOSPITAL | Age: 39
End: 2024-10-03
Attending: INTERNAL MEDICINE
Payer: COMMERCIAL

## 2024-10-03 LAB
ANION GAP SERPL CALC-SCNC: 8 MMOL/L (ref 0–18)
ASPER FLAVUS: NEGATIVE
ASPER FUMIGATUS: NEGATIVE
ASPER NIGER: NEGATIVE
BASOPHILS # BLD AUTO: 0.03 X10(3) UL (ref 0–0.2)
BASOPHILS NFR BLD AUTO: 0.2 %
BLASTOMYCES ABS QN DID: NEGATIVE
BUN BLD-MCNC: 17 MG/DL (ref 9–23)
CALCIUM BLD-MCNC: 8.9 MG/DL (ref 8.7–10.4)
CHLORIDE SERPL-SCNC: 104 MMOL/L (ref 98–112)
CO2 SERPL-SCNC: 24 MMOL/L (ref 21–32)
CREAT BLD-MCNC: 0.81 MG/DL
CRP SERPL-MCNC: <0.4 MG/DL (ref ?–0.5)
EGFRCR SERPLBLD CKD-EPI 2021: 114 ML/MIN/1.73M2 (ref 60–?)
EOSINOPHIL # BLD AUTO: 0 X10(3) UL (ref 0–0.7)
EOSINOPHIL NFR BLD AUTO: 0 %
ERYTHROCYTE [DISTWIDTH] IN BLOOD BY AUTOMATED COUNT: 13.1 %
FUNGITELL RESULT: NEGATIVE
FUNGITELL RESULT: NEGATIVE
FUNGITELL VALUE: <31.25 PG/ML
FUNGITELL VALUE: <31.25 PG/ML
GLUCOSE BLD-MCNC: 126 MG/DL (ref 70–99)
HCT VFR BLD AUTO: 47.9 %
HGB BLD-MCNC: 16.1 G/DL
IMM GRANULOCYTES # BLD AUTO: 0.29 X10(3) UL (ref 0–1)
IMM GRANULOCYTES NFR BLD: 1.7 %
LYMPHOCYTES # BLD AUTO: 0.9 X10(3) UL (ref 1–4)
LYMPHOCYTES NFR BLD AUTO: 5.1 %
MCH RBC QN AUTO: 29.5 PG (ref 26–34)
MCHC RBC AUTO-ENTMCNC: 33.6 G/DL (ref 31–37)
MCV RBC AUTO: 87.7 FL
MONOCYTES # BLD AUTO: 0.91 X10(3) UL (ref 0.1–1)
MONOCYTES NFR BLD AUTO: 5.2 %
NEUTROPHILS # BLD AUTO: 15.41 X10 (3) UL (ref 1.5–7.7)
NEUTROPHILS # BLD AUTO: 15.41 X10(3) UL (ref 1.5–7.7)
NEUTROPHILS NFR BLD AUTO: 87.8 %
OSMOLALITY SERPL CALC.SUM OF ELEC: 285 MOSM/KG (ref 275–295)
PLATELET # BLD AUTO: 369 10(3)UL (ref 150–450)
POTASSIUM SERPL-SCNC: 4.2 MMOL/L (ref 3.5–5.1)
RBC # BLD AUTO: 5.46 X10(6)UL
SODIUM SERPL-SCNC: 136 MMOL/L (ref 136–145)
WBC # BLD AUTO: 17.5 X10(3) UL (ref 4–11)

## 2024-10-03 PROCEDURE — 71045 X-RAY EXAM CHEST 1 VIEW: CPT | Performed by: INTERNAL MEDICINE

## 2024-10-03 PROCEDURE — 99233 SBSQ HOSP IP/OBS HIGH 50: CPT | Performed by: INTERNAL MEDICINE

## 2024-10-03 PROCEDURE — 99231 SBSQ HOSP IP/OBS SF/LOW 25: CPT | Performed by: HOSPITALIST

## 2024-10-03 RX ORDER — ALBUTEROL SULFATE 0.83 MG/ML
2.5 SOLUTION RESPIRATORY (INHALATION) EVERY 4 HOURS PRN
Status: DISCONTINUED | OUTPATIENT
Start: 2024-10-03 | End: 2024-10-05

## 2024-10-03 RX ORDER — ARFORMOTEROL TARTRATE 15 UG/2ML
15 SOLUTION RESPIRATORY (INHALATION)
Status: DISCONTINUED | OUTPATIENT
Start: 2024-10-03 | End: 2024-10-05

## 2024-10-03 NOTE — PROGRESS NOTES
[facility]  Pulmonary/Critical Care/Sleep Medicine Progress note    Ben Lanza Patient Status:  Inpatient    1985 MRN [id number]   Location [facility] 5NW-A Attending Gabriele Stewart MD   Hosp Day # 6 PCP Larry Pisano MD     Chief Complaint   Patient presents with    Difficulty Breathing        History of Present Illness:  Continues on 4 L/min by nasal cannula.  Oxygen saturations are 94%.  Feeling much better overall but still with reduced cough    History:  Past Medical History:    Childhood asthma (HCC)     History reviewed. No pertinent surgical history.  Family History   Problem Relation Age of Onset    Hypertension Father     Anxiety Brother       reports that he has quit smoking. His smoking use included cigarettes. He has never used smokeless tobacco. He reports current alcohol use of about 12.0 standard drinks of alcohol per week. He reports that he does not currently use drugs.    Allergies:  No Known Allergies    Medications:    Current Facility-Administered Medications:     methylPREDNISolone sodium succinate (Solu-MEDROL) injection 40 mg, 40 mg, Intravenous, Q12H    temazepam (Restoril) cap 15 mg, 15 mg, Oral, Nightly PRN    albuterol (Ventolin) (2.5 MG/3ML) 0.083% nebulizer solution 2.5 mg, 2.5 mg, Nebulization, QID    acetaminophen (Tylenol Extra Strength) tab 1,000 mg, 1,000 mg, Oral, Q4H PRN    melatonin tab 3 mg, 3 mg, Oral, Nightly PRN    glycerin-hypromellose- (Artificial Tears) 0.2-0.2-1 % ophthalmic solution 1 drop, 1 drop, Both Eyes, QID PRN    sodium chloride (Saline Mist) 0.65 % nasal solution 1 spray, 1 spray, Each Nare, Q3H PRN    enoxaparin (Lovenox) 40 MG/0.4ML SUBQ injection 40 mg, 40 mg, Subcutaneous, Daily    ondansetron (Zofran) 4 MG/2ML injection 4 mg, 4 mg, Intravenous, Q6H PRN    prochlorperazine (Compazine) 10 MG/2ML injection 5 mg, 5 mg, Intravenous, Q8H PRN    polyethylene glycol (PEG 3350) (Miralax) 17 g oral packet 17 g, 17 g, Oral,  Daily PRN    sennosides (Senokot) tab 17.2 mg, 17.2 mg, Oral, Nightly PRN    bisacodyl (Dulcolax) 10 MG rectal suppository 10 mg, 10 mg, Rectal, Daily PRN    fleet enema (Fleet) rectal enema 133 mL, 1 enema, Rectal, Once PRN    guaiFENesin ER (Mucinex) 12 hr tab 1,200 mg, 1,200 mg, Oral, BID    benzonatate (Tessalon) cap 200 mg, 200 mg, Oral, TID    HYDROcodone-homatropine (Hydromet) 5-1.5 MG/5ML oral syrup 5 mL, 5 mL, Oral, Q4H PRN    budesonide (Pulmicort) 0.5 MG/2ML nebulizer suspension 0.5 mg, 0.5 mg, Nebulization, 2 times daily    Intake/Output:  No intake or output data in the 24 hours ending 10/03/24 1024         Review of Systems    Review of Systems: A comprehensive 10 point review of systems was completed.  Pertinent positives and negatives noted in the the HPI.         Patient Vitals for the past 24 hrs:   BP Temp Temp src Pulse Resp SpO2   09/30/24 1214 118/65 98 °F (36.7 °C) Oral 87 18 95 %   09/30/24 0810 131/80 97.8 °F (36.6 °C) Oral 89 20 94 %   09/30/24 0720 -- -- -- -- -- 94 %   09/30/24 0604 117/74 98.1 °F (36.7 °C) Oral 66 18 92 %   09/29/24 1941 131/83 98 °F (36.7 °C) Oral 81 18 95 %     Vitals:    10/02/24 0750 10/02/24 1131 10/02/24 2000 10/03/24 0357   BP:  131/69 136/75 112/67   BP Location:  Left arm Left arm Left arm   Pulse:  84 87 71   Resp:  18 18 18   Temp:  97.7 °F (36.5 °C) 98 °F (36.7 °C) 97.8 °F (36.6 °C)   TempSrc:  Oral Oral Oral   SpO2: 92% 91% 92% 94%   Weight:       Height:          Body mass index is 32.07 kg/m².     Physical Exam  Constitutional:       General: He is not in acute distress.     Appearance: Normal appearance. He is not ill-appearing or diaphoretic.   HENT:      Head: Normocephalic and atraumatic.      Nose: Nose normal. No congestion or rhinorrhea.      Mouth/Throat:      Mouth: Mucous membranes are moist.      Pharynx: Oropharynx is clear. No oropharyngeal exudate or posterior oropharyngeal erythema.   Eyes:      Extraocular Movements: Extraocular movements  intact.      Pupils: Pupils are equal, round, and reactive to light.   Cardiovascular:      Rate and Rhythm: Normal rate.      Pulses: Normal pulses.      Heart sounds: Normal heart sounds. No murmur heard.  Pulmonary:      Effort: Pulmonary effort is normal. No respiratory distress.      Breath sounds: Normal breath sounds. No wheezing or rhonchi.      Comments: Crackles at right lower lobe zones greater than left lung base but with improved air entry  Chest:      Chest wall: No tenderness.   Abdominal:      General: Abdomen is flat. Bowel sounds are normal.      Palpations: Abdomen is soft.   Musculoskeletal:         General: Normal range of motion.   Skin:     General: Skin is warm.   Neurological:      General: No focal deficit present.      Mental Status: He is alert and oriented to person, place, and time.   Psychiatric:         Mood and Affect: Mood normal.         Behavior: Behavior normal.         Thought Content: Thought content normal.         Judgment: Judgment normal.           Lab Data Review:    Recent Labs   Lab 09/27/24  1149 10/02/24  0616 10/03/24  0651   WBC 9.3 15.6* 17.5*   HGB 15.7 16.2 16.1   HCT 46.1 48.0 47.9   .0 397.0 369.0       Recent Labs   Lab 09/27/24  1149 09/30/24  0949 10/02/24  0616 10/03/24  0651    137 137 136   K 3.7 4.1 4.4 4.2    105 105 104   CO2 25.0 23.0 23.0 24.0   BUN 8* 15 16 17   CREATSERUM 0.90 0.97 0.81 0.81   CA 9.6 9.3 9.1 8.9   ALB 4.5  --  4.1  --    ALKPHO 87  --  66  --    ALT 31  --  58*  --    AST 30  --  12  --    * 218* 157* 126*       Recent Labs   Lab 09/30/24  0949   MG 2.2       Lab Results   Component Value Date    PHOS 3.7 09/30/2024        No results for input(s): \"PT\", \"INR\", \"PTT\" in the last 168 hours.    Recent Labs   Lab 09/28/24  1054   ABGPHT 7.50*   XNUYKK1L 32*   CSTOC8K 62*   ABGHCO3 26.7   ABGBE 2.5*   TEMP 98.6   TERRELL Positive   SITE Left Radial   DEV High flow nasal cannula   THGB 15.4       No results for  input(s): \"TROP\", \"CKMB\" in the last 168 hours.      Cultures:   Hospital Encounter on 24   1. Blood Culture     Status: None    Collection Time: 24 11:49 AM    Specimen: Blood,peripheral   Result Value Ref Range    Blood Culture Result No Growth 5 Days N/A        Transthoracic Echocardiogram     Name:Ben Lanza     Date: 10/01/2024 :  1985 Ht:  (71in)  BP: 132 / 85   MRN:  [id number]     Age:  39years    Wt:  (229lb) HR: 85bpm   Loc:  [facility]       Gndr: M          BSA: 2.23m^2   Sonographer: LAUREN Garza MS     Ordering:    Gabriele Stewart     ----------------------------------------------------------------------------   History/Indications: Hypoxia.     ----------------------------------------------------------------------------   Procedure information:  A transthoracic complete 2D study was performed.   Additional evaluation included M-mode, complete spectral Doppler, and color   Doppler.  Patient status:  Inpatient.  Location:  [place] [address].    No prior   study was available for comparison.    This was a routine study.   Transthoracic echocardiography for ventricular function evaluation and   assessment of valvular function. Image quality was adequate.     ECG rhythm:   Normal sinus     ----------------------------------------------------------------------------     Conclusions:     1. Left ventricle: The cavity size was normal. Wall thickness was normal.      Systolic function was normal. The estimated ejection fraction was 60-65%,      by visual assessment. No diagnostic evidence for regional wall motion      abnormalities.   2. Right ventricle: The cavity size was normal. Systolic function was      normal.   3. No significant valvular heart disease (stenosis or regurgitation)   Impressions:  No previous study from [facility] was   available for comparison.     Radiology personally reviewed:  XR CHEST AP PORTABLE  (CPT=71045)    Result Date: 10/3/2024  CONCLUSION:  See above    LOCATION:  [place]      Dictated by (CST): Reno Young MD on 10/03/2024 at 8:56 AM     Finalized by (CST): Reno Young MD on 10/03/2024 at 8:56 AM         Patient Active Problem List   Diagnosis    Hypoxia    Pneumonia of both lungs due to infectious organism, unspecified part of lung    Acute hypoxemic respiratory failure (HCC)    Severe persistent asthma with exacerbation (HCC)    Therapeutic drug monitoring     Assessment:  Acute hypoxic respiratory failure suspect related to postviral pneumonitis versus atypical infection: FiO2 weaned to 4 L/min via high flow nasal cannula and oxygen saturation 94%  Bilateral lower lobe patchy consolidation in recent influenza infection: With bilateral upper lobe patchy groundglass/mosaic attenuation density suggesting multifocal pneumonia though cannot rule out interstitial lung disease, strep pneumonia antigen negative ESR was elevated, QuantiFERON TB Gold test was negative, respiratory expanded to panel with COVID was negative including mycoplasma and chlamydia PCR, Legionella urinary antigen negative,: Fungitell D panel negative proBNP normal range and echocardiogram showed adequate left ventricular ejection fraction.  Radiologically improved aeration in bilateral lower lobes and patient less dyspneic, CRP normal range now  Shortness of breath: Improving  Asthma with acute exacerbation: Improving  Leukocytosis: Worsened  History of tobacco use      Plan:  Wean FiO2 to keep oxygen saturations between 90% to 92%  Completed antibiotic course  Budesonide 0.5 mg nebulizers every 12 hours  Add Brovana 15 mcg nebulizer every 12 hours  Continue albuterol 2.5 mg nebulizers every 4 hours as needed  Continue Solu-Medrol 40 mg IV every 12 hours  Benzonatate 200 mg 3 times a day as needed for cough  DVT prophylaxis:   Lovenox 40 mg subcutaneous every 24 hours  GI prophylaxis: --  Will follow for further recommendations  Follow labs, CRP,    Thank You for allowing me to participate  in this patient's care     Richy Metz MD      Note to the patient: The 21st Century Cures Act makes medical notes like these available to patients in the interest of transparency. However, be advised that this is a medical document. It is intended as peer to peer communication. It is written in medical language and may contain abbreviations or verbiage that are unfamiliar. It may appear blunt or direct. Medical documents are intended to carry relevant information, facts as evident, and clinical opinion of the practitioner.      Disclaimer: Components of this note were documented using voice recognition system and are subject to errors not corrected at proofreading. Contact the author of this note for any clarifications

## 2024-10-03 NOTE — PLAN OF CARE
Received patient on NC 4L, saturating well. VSS. Medications administered per the MAR and patient needs addressed. Patient is A & O x4, continent and up independently in the room. Updated on plan of care.

## 2024-10-03 NOTE — PLAN OF CARE
Patient alert and oriented x4. , 4L NC high flow. IV solumedrol. Q8 VS. Lovenox subcutaneous. Regular diet. No pain or discomfort reported. Up ad alex. Saline locked. No further needs at this time, call light within reach.    Problem: Patient/Family Goals  Goal: Patient/Family Long Term Goal  Description: Patient's Long Term Goal: Discharge with adequate resources    Interventions:  - See additional Care Plan goals for specific interventions  10/2/2024 2324 by Dorothy Preciado RN  Outcome: Progressing  10/2/2024 2323 by Dorothy Preciado RN  Outcome: Progressing  Goal: Patient/Family Short Term Goal  Description: Patient's Short Term Goal:   9/28 AM: Wean O2 as tolerated  9/28noc: wean O2  9/29 AM: Wean O2 as tolerated  9/29 noc; wean oxygen  9/29 AM: wean O2 as tolerated  10/2 noc: Wean o2 as tolerated  Interventions:   - See additional Care Plan goals for specific interventions  10/2/2024 2324 by Dorothy Preciado RN  Outcome: Progressing  10/2/2024 2323 by Dorothy Preciado RN  Outcome: Progressing     Problem: CARDIOVASCULAR - ADULT  Goal: Maintains optimal cardiac output and hemodynamic stability  Description: INTERVENTIONS:  - Monitor vital signs, rhythm, and trends  - Monitor for bleeding, hypotension and signs of decreased cardiac output  - Evaluate effectiveness of vasoactive medications to optimize hemodynamic stability  - Monitor arterial and/or venous puncture sites for bleeding and/or hematoma  - Assess quality of pulses, skin color and temperature  - Assess for signs of decreased coronary artery perfusion - ex. Angina  - Evaluate fluid balance, assess for edema, trend weights  10/2/2024 2324 by Dorothy Preciado RN  Outcome: Progressing  10/2/2024 2323 by Dorothy Preciado RN  Outcome: Progressing     Problem: RESPIRATORY - ADULT  Goal: Achieves optimal ventilation and oxygenation  Description: INTERVENTIONS:  - Assess for changes in respiratory status  - Assess for changes in mentation and  behavior  - Position to facilitate oxygenation and minimize respiratory effort  - Oxygen supplementation based on oxygen saturation or ABGs  - Provide Smoking Cessation handout, if applicable  - Encourage broncho-pulmonary hygiene including cough, deep breathe, Incentive Spirometry  - Assess the need for suctioning and perform as needed  - Assess and instruct to report SOB or any respiratory difficulty  - Respiratory Therapy support as indicated  - Manage/alleviate anxiety  - Monitor for signs/symptoms of CO2 retention  10/2/2024 2324 by Dorothy Preciado, RN  Outcome: Progressing  10/2/2024 2323 by Dorothy Preciado, RN  Outcome: Progressing

## 2024-10-03 NOTE — PROGRESS NOTES
[facility]   part of [facility]     Hospitalist Progress Note     Ben Lazna Patient Status:  Inpatient    1985 MRN [id number]   Location [facility] 5NW-A Attending Gabriele Stewart MD   Hosp Day # 6 PCP Larry Pisano MD     Chief Complaint: Pneumonitis    Subjective:     Patient making improvement. Still short of breath with activity. +cough    Objective:    Review of Systems:   ROS completed; pertinent positive and negatives stated in subjective.    Vital signs:  Temp:  [97.7 °F (36.5 °C)-98 °F (36.7 °C)] 97.8 °F (36.6 °C)  Pulse:  [71-87] 71  Resp:  [18] 18  BP: (112-136)/(67-75) 112/67  SpO2:  [91 %-94 %] 94 %    Physical Exam:    General: No acute distress  Respiratory: Diminished right base but with increased aeration   Cardiovascular: S1, S2.  Abdomen: Soft  Neuro: No new focal deficits    Diagnostic Data:    Labs:  Recent Labs   Lab 24  1149 10/02/24  0616 10/03/24  0651   WBC 9.3 15.6* 17.5*   HGB 15.7 16.2 16.1   MCV 86.5 86.8 87.7   .0 397.0 369.0       Recent Labs   Lab 24  1149 24  0949 10/02/24  0616 10/03/24  0651   * 218* 157* 126*   BUN 8* 15 16 17   CREATSERUM 0.90 0.97 0.81 0.81   CA 9.6 9.3 9.1 8.9   ALB 4.5  --  4.1  --     137 137 136   K 3.7 4.1 4.4 4.2    105 105 104   CO2 25.0 23.0 23.0 24.0   ALKPHO 87  --  66  --    AST 30  --  12  --    ALT 31  --  58*  --    BILT 0.4  --  0.5  --    TP 7.8  --  6.8  --        Estimated Creatinine Clearance: 127.8 mL/min (based on SCr of 0.81 mg/dL).     No results for input(s): \"TROP\", \"TROPHS\", \"CK\" in the last 168 hours.    No results for input(s): \"PTP\", \"INR\" in the last 168 hours.           Imaging: Imaging data reviewed in Epic.    Medications:    methylPREDNISolone  40 mg Intravenous Q12H    albuterol  2.5 mg Nebulization QID    enoxaparin  40 mg Subcutaneous Daily    guaiFENesin ER  1,200 mg Oral BID    benzonatate  200 mg Oral TID    budesonide  0.5 mg Nebulization 2  times daily       Assessment & Plan:     #Acute hypoxic respiratory failure d/t pneumonitis from recent viral infection vs pneumonia, MRSA nares neg, RVP neg, Legionella neg, ECHO EF: 60-65%  -IV steroids  -Nebs  -Antitussives  -Oxygen - wean as able, currently 4L  -CXR pending   -Incentive spirometry, flutter  -Will need repeat imaging as outpatient   -Pulmonary consult    #Steroid induced hyperglycemia    Plan of care discussed with patient.     Mike ASHLEY    Supplementary Documentation:     Quality:    DVT Mechanical Prophylaxis:     Early ambuation  DVT Pharmacologic Prophylaxis   Medication    enoxaparin (Lovenox) 40 MG/0.4ML SUBQ injection 40 mg                Code Status: Not on file  Owens: No urinary catheter in place  Owens Duration (in days):   Central line:    ELDER:       The 21st Century Cures Act makes medical notes like these available to patients in the interest of transparency. Please be advised this is a medical document. Medical documents are intended to carry relevant information, facts as evident, and the clinical opinion of the practitioner. The medical note is intended as peer to peer communication and may appear blunt or direct. It is written in medical language and may contain abbreviations or verbiage that are unfamiliar.

## 2024-10-04 PROCEDURE — 99233 SBSQ HOSP IP/OBS HIGH 50: CPT | Performed by: INTERNAL MEDICINE

## 2024-10-04 PROCEDURE — 99231 SBSQ HOSP IP/OBS SF/LOW 25: CPT | Performed by: HOSPITALIST

## 2024-10-04 RX ORDER — PREDNISONE 20 MG/1
40 TABLET ORAL
Status: DISCONTINUED | OUTPATIENT
Start: 2024-10-05 | End: 2024-10-05

## 2024-10-04 NOTE — PLAN OF CARE
A/O x4. O2 weaned to room air from 4L. SPO2 >91%. O2 walk today. No tele. Lovenox. Voids. Continent. Last BM today. Denies pain/SOB. IV solumedrol and rocephin. PO prednisone tomorrow. Up self. PIV SL. No further needs at this time.   Problem: Patient/Family Goals  Goal: Patient/Family Long Term Goal  Description: Patient's Long Term Goal: Discharge with adequate resources    Interventions:  - See additional Care Plan goals for specific interventions  Outcome: Progressing  Goal: Patient/Family Short Term Goal  Description: Patient's Short Term Goal:   9/28 AM: Wean O2 as tolerated  9/28noc: wean O2  9/29 AM: Wean O2 as tolerated  9/29 noc; wean oxygen  9/29 AM: wean O2 as tolerated  10/2 noc: Wean o2 as tolerated  10/3noc: wean O2  Interventions:   - See additional Care Plan goals for specific interventions  Outcome: Progressing     Problem: CARDIOVASCULAR - ADULT  Goal: Maintains optimal cardiac output and hemodynamic stability  Description: INTERVENTIONS:  - Monitor vital signs, rhythm, and trends  - Monitor for bleeding, hypotension and signs of decreased cardiac output  - Evaluate effectiveness of vasoactive medications to optimize hemodynamic stability  - Monitor arterial and/or venous puncture sites for bleeding and/or hematoma  - Assess quality of pulses, skin color and temperature  - Assess for signs of decreased coronary artery perfusion - ex. Angina  - Evaluate fluid balance, assess for edema, trend weights  Outcome: Progressing     Problem: RESPIRATORY - ADULT  Goal: Achieves optimal ventilation and oxygenation  Description: INTERVENTIONS:  - Assess for changes in respiratory status  - Assess for changes in mentation and behavior  - Position to facilitate oxygenation and minimize respiratory effort  - Oxygen supplementation based on oxygen saturation or ABGs  - Provide Smoking Cessation handout, if applicable  - Encourage broncho-pulmonary hygiene including cough, deep breathe, Incentive Spirometry  -  Assess the need for suctioning and perform as needed  - Assess and instruct to report SOB or any respiratory difficulty  - Respiratory Therapy support as indicated  - Manage/alleviate anxiety  - Monitor for signs/symptoms of CO2 retention  Outcome: Progressing

## 2024-10-04 NOTE — PROGRESS NOTES
10/04/24 1700   Mobility   O2 walk? Yes   SPO2% on Room Air at Rest 92   SPO2% Ambulation on Room Air 90     No oxygen required during walk   Goal to loss about 10% of initial weight (336lbs)  Noted to have uncontrolled HTN. Need better BP control     Diet: cont. Portion control and avoidance of sugary drinks     Exercise: commended on current exercise regimen     Advised to increase intensity    Meds: Better BP control    Started on orlistat in the interim    A/e, risks and benefits discussed     Surgery: not interested at this time

## 2024-10-04 NOTE — PLAN OF CARE
Pt Aox4. Spo2 >90% on 4L, RA baseline. IV steroids. No tele, Lovenox. Up ad alex. IV abx. Call light within reach, safety precautions in place. POC continues.    Problem: Patient/Family Goals  Goal: Patient/Family Long Term Goal  Description: Patient's Long Term Goal: Discharge with adequate resources    Interventions:  - See additional Care Plan goals for specific interventions  Outcome: Progressing  Goal: Patient/Family Short Term Goal  Description: Patient's Short Term Goal:   9/28 AM: Wean O2 as tolerated  9/28noc: wean O2  9/29 AM: Wean O2 as tolerated  9/29 noc; wean oxygen  9/29 AM: wean O2 as tolerated  10/2 noc: Wean o2 as tolerated  10/3noc: wean O2  Interventions:   - See additional Care Plan goals for specific interventions  Outcome: Progressing     Problem: CARDIOVASCULAR - ADULT  Goal: Maintains optimal cardiac output and hemodynamic stability  Description: INTERVENTIONS:  - Monitor vital signs, rhythm, and trends  - Monitor for bleeding, hypotension and signs of decreased cardiac output  - Evaluate effectiveness of vasoactive medications to optimize hemodynamic stability  - Monitor arterial and/or venous puncture sites for bleeding and/or hematoma  - Assess quality of pulses, skin color and temperature  - Assess for signs of decreased coronary artery perfusion - ex. Angina  - Evaluate fluid balance, assess for edema, trend weights  Outcome: Progressing     Problem: RESPIRATORY - ADULT  Goal: Achieves optimal ventilation and oxygenation  Description: INTERVENTIONS:  - Assess for changes in respiratory status  - Assess for changes in mentation and behavior  - Position to facilitate oxygenation and minimize respiratory effort  - Oxygen supplementation based on oxygen saturation or ABGs  - Provide Smoking Cessation handout, if applicable  - Encourage broncho-pulmonary hygiene including cough, deep breathe, Incentive Spirometry  - Assess the need for suctioning and perform as needed  - Assess and instruct  to report SOB or any respiratory difficulty  - Respiratory Therapy support as indicated  - Manage/alleviate anxiety  - Monitor for signs/symptoms of CO2 retention  Outcome: Progressing

## 2024-10-04 NOTE — PROGRESS NOTES
[facility]   part of [facility]     Hospitalist Progress Note     Ben Lanza Patient Status:  Inpatient    1985 MRN [id number]   Location [facility] 5NW-A Attending Gabriele Stewart MD   Hosp Day # 7 PCP Larry Pisano MD     Chief Complaint: Pneumonitis    Subjective:     Patient with coughing fit last night. Had not had nebs this morning at time of visit. Oxygen weaned from 4L to 3L during visit.     Objective:    Review of Systems:   ROS completed; pertinent positive and negatives stated in subjective.    Vital signs:  Temp:  [97.8 °F (36.6 °C)] 97.8 °F (36.6 °C)  Pulse:  [68-79] 68  Resp:  [18] 18  BP: (114-143)/(55-86) 115/79  SpO2:  [93 %-94 %] 94 %    Physical Exam:    General: No acute distress  Respiratory: Coarse right base  Cardiovascular: S1, S2.  Abdomen: Soft  Neuro: No new focal deficits    Diagnostic Data:    Labs:  Recent Labs   Lab 24  1149 10/02/24  0616 10/03/24  0651   WBC 9.3 15.6* 17.5*   HGB 15.7 16.2 16.1   MCV 86.5 86.8 87.7   .0 397.0 369.0       Recent Labs   Lab 24  1149 24  0949 10/02/24  0616 10/03/24  0651   * 218* 157* 126*   BUN 8* 15 16 17   CREATSERUM 0.90 0.97 0.81 0.81   CA 9.6 9.3 9.1 8.9   ALB 4.5  --  4.1  --     137 137 136   K 3.7 4.1 4.4 4.2    105 105 104   CO2 25.0 23.0 23.0 24.0   ALKPHO 87  --  66  --    AST 30  --  12  --    ALT 31  --  58*  --    BILT 0.4  --  0.5  --    TP 7.8  --  6.8  --        Estimated Creatinine Clearance: 127.8 mL/min (based on SCr of 0.81 mg/dL).     No results for input(s): \"TROP\", \"TROPHS\", \"CK\" in the last 168 hours.    No results for input(s): \"PTP\", \"INR\" in the last 168 hours.           Imaging: Imaging data reviewed in Epic.    Medications:    arformoterol  15 mcg Nebulization 2 times daily    methylPREDNISolone  40 mg Intravenous Q12H    enoxaparin  40 mg Subcutaneous Daily    guaiFENesin ER  1,200 mg Oral BID    benzonatate  200 mg Oral TID    budesonide  0.5  mg Nebulization 2 times daily       Assessment & Plan:     #Acute hypoxic respiratory failure d/t pneumonitis from recent viral infection vs pneumonia, MRSA nares neg, RVP neg, Legionella neg, ECHO EF: 60-65%  -IV steroids  -Nebs  -Antitussives  -Oxygen - wean as able, currently 3L  -Incentive spirometry, flutter  -Will need repeat imaging as outpatient   -Pulmonary consult    #Steroid induced hyperglycemia    Plan of care discussed with patient and RN.    Mike ASHLEY    Supplementary Documentation:     Quality:    DVT Mechanical Prophylaxis:     Early ambuation  DVT Pharmacologic Prophylaxis   Medication    enoxaparin (Lovenox) 40 MG/0.4ML SUBQ injection 40 mg                Code Status: Not on file  Owens: No urinary catheter in place  Owens Duration (in days):   Central line:    ELDER:       The 21st Century Cures Act makes medical notes like these available to patients in the interest of transparency. Please be advised this is a medical document. Medical documents are intended to carry relevant information, facts as evident, and the clinical opinion of the practitioner. The medical note is intended as peer to peer communication and may appear blunt or direct. It is written in medical language and may contain abbreviations or verbiage that are unfamiliar.

## 2024-10-04 NOTE — PAYOR COMM NOTE
CONTINUED STAY REVIEW    Payor: [employer]  Subscriber #:  [id number]  Authorization Number: [id number]    Admit date: 24  Admit time:  2:15 PM    REVIEW DOCUMENTATION:10/1-10/3      10/1/2024 Hospitalist Progress Note  Hospitalist Progress Note            Ben Lanza Patient Status:  Inpatient    1985 MRN [id number]   [facility] 5NW-A Attending Gabriele Stewart MD   Hosp Day # 4 PCP Larry Pisano MD      Chief Complaint: dyspea/cough        Subjective:  Patient feels about the same.  O2 req a bit lower today           Objective:  Review of Systems:   ROS completed; pertinent positive and negatives stated in subjective.     Vital signs:  Temp:  [97.7 °F (36.5 °C)-97.9 °F (36.6 °C)] 97.7 °F (36.5 °C)  Pulse:  [71-97] 85  Resp:  [18-20] 18  BP: (118-132)/(74-85) 132/85  SpO2:  [90 %-93 %] 90 %     Physical Exam:    General: No acute distress  Respiratory: b/l rhonchi  Cardiovascular: S1, S2.  Abdomen: Soft  Neuro: No new focal deficits        Diagnostic Data:    Labs:      Recent Labs   Lab 24  1149   WBC 9.3   HGB 15.7   MCV 86.5   .0              Recent Labs   Lab 24  1149 24  0949   * 218*   BUN 8* 15   CREATSERUM 0.90 0.97   CA 9.6 9.3   ALB 4.5  --     137   K 3.7 4.1    105   CO2 25.0 23.0   ALKPHO 87  --    AST 30  --    ALT 31  --    BILT 0.4  --    TP 7.8  --          Estimated Creatinine Clearance: 106.7 mL/min (based on SCr of 0.97 mg/dL).      No results for input(s): \"TROP\", \"TROPHS\", \"CK\" in the last 168 hours.     No results for input(s): \"PTP\", \"INR\" in the last 168 hours.     Imaging: Imaging data reviewed in Epic.     Medications:   Scheduled Medications    methylPREDNISolone  40 mg Intravenous Q6H    albuterol  2.5 mg Nebulization QID    enoxaparin  40 mg Subcutaneous Daily    cefTRIAXone  1 g Intravenous Q24H    guaiFENesin ER  1,200 mg Oral BID    benzonatate  200 mg Oral TID    budesonide  0.5 mg  Nebulization 2 times daily           Assessment & Plan:  #Acute Hypoxic resp failure  Cont. O2 and wean as able  CTA:  b/l PNA, no PE  Suspect due to pneumonitis from recent viral infection  Echo: EF: 60-65%  Cont. High dose steroids, inhalers, nebs     #PNA  Cont. Rocephin  Completed azithro  MRSA nares neg, vanco stopped  RVP neg  COVID neg  Legionella neg  SCX: neg  PCT: 0.1  Will f/u with pulm and have repeat imaging to r/o other underlying pathology       #Steroid induced hyperglycemia  Monitor, insulin if persisently >180       Dispo: as above.      Gabriele Stewart MD       Supplementary Documentation:  Quality:     DVT Mechanical Prophylaxis:     Early ambuation      DVT Pharmacologic Prophylaxis   Medication    enoxaparin (Lovenox) 40 MG/0.4ML SUBQ injection 40 mg           ELDER:    Discharge is dependent on: clinical stability       10/1/2024 Pulmonology Progress Report   Ben Lanza Patient Status:  Inpatient    1985 MRN [id number]   [facility] 5N-A Attending Gabriele Stewart MD   Hosp Day # 4 PCP Larry Pisano MD          Chief Complaint   Patient presents with    Difficulty Breathing         History of Present Illness:  Stable overnight.  Continues to require FiO2 6 L/min by high flow nasal cannula.  Belittled feels better.     History:  Past Medical History       Past Medical History:    Childhood asthma (HCC)         Past Surgical History   History reviewed. No pertinent surgical history.     Family History         Family History   Problem Relation Age of Onset    Hypertension Father      Anxiety Brother            reports that he has quit smoking. His smoking use included cigarettes. He has never used smokeless tobacco. He reports current alcohol use of about 12.0 standard drinks of alcohol per week. He reports that he does not currently use drugs.     Allergies:  Allergies   No Known Allergies        Medications:    Current Hospital Medications      Current  Facility-Administered Medications:     temazepam (Restoril) cap 15 mg, 15 mg, Oral, Nightly PRN    methylPREDNISolone sodium succinate (Solu-MEDROL) injection 40 mg, 40 mg, Intravenous, Q6H    albuterol (Ventolin) (2.5 MG/3ML) 0.083% nebulizer solution 2.5 mg, 2.5 mg, Nebulization, QID    acetaminophen (Tylenol Extra Strength) tab 1,000 mg, 1,000 mg, Oral, Q4H PRN    melatonin tab 3 mg, 3 mg, Oral, Nightly PRN    glycerin-hypromellose- (Artificial Tears) 0.2-0.2-1 % ophthalmic solution 1 drop, 1 drop, Both Eyes, QID PRN    sodium chloride (Saline Mist) 0.65 % nasal solution 1 spray, 1 spray, Each Nare, Q3H PRN    enoxaparin (Lovenox) 40 MG/0.4ML SUBQ injection 40 mg, 40 mg, Subcutaneous, Daily    ondansetron (Zofran) 4 MG/2ML injection 4 mg, 4 mg, Intravenous, Q6H PRN    prochlorperazine (Compazine) 10 MG/2ML injection 5 mg, 5 mg, Intravenous, Q8H PRN    polyethylene glycol (PEG 3350) (Miralax) 17 g oral packet 17 g, 17 g, Oral, Daily PRN    sennosides (Senokot) tab 17.2 mg, 17.2 mg, Oral, Nightly PRN    bisacodyl (Dulcolax) 10 MG rectal suppository 10 mg, 10 mg, Rectal, Daily PRN    fleet enema (Fleet) rectal enema 133 mL, 1 enema, Rectal, Once PRN    cefTRIAXone (Rocephin) 1 g in sodium chloride 0.9% 100 mL IVPB-ADDV, 1 g, Intravenous, Q24H    guaiFENesin ER (Mucinex) 12 hr tab 1,200 mg, 1,200 mg, Oral, BID    benzonatate (Tessalon) cap 200 mg, 200 mg, Oral, TID    HYDROcodone-homatropine (Hydromet) 5-1.5 MG/5ML oral syrup 5 mL, 5 mL, Oral, Q4H PRN    budesonide (Pulmicort) 0.5 MG/2ML nebulizer suspension 0.5 mg, 0.5 mg, Nebulization, 2 times daily        Intake/Output:     Intake/Output Summary (Last 24 hours) at 10/1/2024 1331  Last data filed at 9/30/2024 1418      Gross per 24 hour   Intake 240 ml   Output --   Net 240 ml            Review of Systems     Review of Systems: A comprehensive 10 point review of systems was completed.  Pertinent positives and negatives noted in the the HPI.             Patient Vitals for the past 24 hrs:    BP Temp Temp src Pulse Resp SpO2   09/30/24 1214 118/65 98 °F (36.7 °C) Oral 87 18 95 %   09/30/24 0810 131/80 97.8 °F (36.6 °C) Oral 89 20 94 %   09/30/24 0720 -- -- -- -- -- 94 %   09/30/24 0604 117/74 98.1 °F (36.7 °C) Oral 66 18 92 %   09/29/24 1941 131/83 98 °F (36.7 °C) Oral 81 18 95 %      Vitals          Vitals:     09/30/24 2035 10/01/24 0515 10/01/24 0807 10/01/24 0852   BP: 129/80 118/79   132/85   BP Location: Left arm         Pulse: 97 71   85   Resp: 18     18   Temp: 97.8 °F (36.6 °C)     97.7 °F (36.5 °C)   TempSrc: Oral         SpO2: 91% 90% 93% 90%   Weight:           Height:                 Body mass index is 32.07 kg/m².      Physical Exam  Constitutional:       General: He is not in acute distress.     Appearance: Normal appearance. He is not ill-appearing or diaphoretic.   HENT:      Head: Normocephalic and atraumatic.      Nose: Nose normal. No congestion or rhinorrhea.      Mouth/Throat:      Mouth: Mucous membranes are moist.      Pharynx: Oropharynx is clear. No oropharyngeal exudate or posterior oropharyngeal erythema.   Eyes:      Extraocular Movements: Extraocular movements intact.      Pupils: Pupils are equal, round, and reactive to light.   Cardiovascular:      Rate and Rhythm: Normal rate.      Pulses: Normal pulses.      Heart sounds: Normal heart sounds. No murmur heard.  Pulmonary:      Effort: Pulmonary effort is normal. No respiratory distress.      Breath sounds: Normal breath sounds. No wheezing or rhonchi.      Comments: Crackles at left lung base  Chest:      Chest wall: No tenderness.   Abdominal:      General: Abdomen is flat. Bowel sounds are normal.      Palpations: Abdomen is soft.   Musculoskeletal:         General: Normal range of motion.   Skin:     General: Skin is warm.   Neurological:      General: No focal deficit present.      Mental Status: He is alert and oriented to person, place, and time.   Psychiatric:         Mood  and Affect: Mood normal.         Behavior: Behavior normal.         Thought Content: Thought content normal.         Judgment: Judgment normal.               Lab Data Review:         Recent Labs   Lab 09/27/24  1149   WBC 9.3   HGB 15.7   HCT 46.1   .0              Recent Labs   Lab 09/27/24  1149 09/30/24  0949    137   K 3.7 4.1    105   CO2 25.0 23.0   BUN 8* 15   CREATSERUM 0.90 0.97   CA 9.6 9.3   ALB 4.5  --    ALKPHO 87  --    ALT 31  --    AST 30  --    * 218*             Recent Labs   Lab 09/30/24  0949   MG 2.2               Lab Results   Component Value Date     PHOS 3.7 09/30/2024         No results for input(s): \"PT\", \"INR\", \"PTT\" in the last 168 hours.         Recent Labs   Lab 09/28/24  1054   ABGPHT 7.50*   PMHPLO8A 32*   WPDQV5J 62*   ABGHCO3 26.7   ABGBE 2.5*   TEMP 98.6   TERRELL Positive   SITE Left Radial   DEV High flow nasal cannula   THGB 15.4         No results for input(s): \"TROP\", \"CKMB\" in the last 168 hours.        Cultures:         Hospital Encounter on 09/27/24   1. Blood Culture     Status: None (Preliminary result)     Collection Time: 09/27/24 11:49 AM     Specimen: Blood,peripheral   Result Value Ref Range     Blood Culture Result No Growth 3 Days N/A              Radiology personally reviewed:  No results found.          Patient Active Problem List   Diagnosis    Hypoxia    Pneumonia of both lungs due to infectious organism, unspecified part of lung    Acute hypoxic respiratory failure (HCC)    Severe persistent asthma with exacerbation (HCC)    Therapeutic drug monitoring      Assessment:  Acute hypoxic respiratory failure suspect related to postviral pneumonitis versus atypical infection: Requiring supplemental oxygen 6 L/min via high flow nasal cannula  Bilateral lower lobe patchy consolidation in recent influenza infection: With bilateral upper lobe patchy groundglass/mosaic attenuation density suggesting multifocal pneumonia though cannot rule out  interstitial lung disease, strep pneumonia antigen negative ESR was elevated, QuantiFERON TB Gold test was negative, respiratory expanded to panel with COVID was negative including mycoplasma and chlamydia PCR, Legionella urinary antigen negative,:.  Clinically improving  Shortness of breath  Asthma with acute exacerbation  History of tobacco use        Plan:  Wean FiO2 to keep oxygen saturations between 90% to 92% continue current antibiotics  Budesonide 0.5 mg nebulizers every 12 hours  DuoNebs 4 times daily  Continue current antibiotics  Continue Solu-Medrol 40 mg IV every 6 hours  DVT prophylaxis:   Lovenox 40 mg subcutaneous every 24 hours  GI prophylaxis: --  Will follow for further recommendations     Thank You for allowing me to participate in this patient's care      Richy Metz MD       10/2/2024 Hospitalist Progress Note  Chief Complaint: Pneumonitis        Subjective:  Patient making improvement. On 4L via NC. Pulling 1500+ on IS. Minimal cough/phlegm            Objective:  Review of Systems:   ROS completed; pertinent positive and negatives stated in subjective.     Vital signs:  Temp:  [97 °F (36.1 °C)-97.9 °F (36.6 °C)] 97.7 °F (36.5 °C)  Pulse:  [65-86] 84  Resp:  [18] 18  BP: (118-131)/(69-83) 131/69  SpO2:  [91 %-93 %] 91 %     Physical Exam:    General: No acute distress  Respiratory: Diminished at bases R>L  Cardiovascular: S1, S2.  Abdomen: Soft  Neuro: No new focal deficits     Diagnostic Data:    Labs:       Recent Labs   Lab 09/27/24  1149 10/02/24  0616   WBC 9.3 15.6*   HGB 15.7 16.2   MCV 86.5 86.8   .0 397.0               Recent Labs   Lab 09/27/24  1149 09/30/24  0949 10/02/24  0616   * 218* 157*   BUN 8* 15 16   CREATSERUM 0.90 0.97 0.81   CA 9.6 9.3 9.1   ALB 4.5  --  4.1    137 137   K 3.7 4.1 4.4    105 105   CO2 25.0 23.0 23.0   ALKPHO 87  --  66   AST 30  --  12   ALT 31  --  58*   BILT 0.4  --  0.5   TP 7.8  --  6.8         Estimated Creatinine  Clearance: 127.8 mL/min (based on SCr of 0.81 mg/dL).      No results for input(s): \"TROP\", \"TROPHS\", \"CK\" in the last 168 hours.     No results for input(s): \"PTP\", \"INR\" in the last 168 hours.           Imaging: Imaging data reviewed in Epic.     Medications:   Scheduled Medications    methylPREDNISolone  40 mg Intravenous Q6H    albuterol  2.5 mg Nebulization QID    enoxaparin  40 mg Subcutaneous Daily    guaiFENesin ER  1,200 mg Oral BID    benzonatate  200 mg Oral TID    budesonide  0.5 mg Nebulization 2 times daily         Assessment & Plan:  #Acute hypoxic respiratory failure d/t pneumonitis from recent viral infection vs pneumonia, MRSA nares neg, RVP neg, Legionella neg, ECHO EF: 60-65%  -IV steroids  -Nebs  -Antitussives  -Oxygen - wean as able, currently 4L  -Incentive spirometry, flutter - reviewed with patient   -Will need repeat imaging as outpatient   -Pulmonary consult     #Steroid induced hyperglycemia     Plan of care discussed with patient and RN.     Mike ASHLEY       Supplementary Documentation:  Quality:     DVT Mechanical Prophylaxis:     Early ambuation      DVT Pharmacologic Prophylaxis   Medication    enoxaparin (Lovenox) 40 MG/0.4ML SUBQ injection 40 mg            10/3/2024 Hospitalist Progress Note  Chief Complaint: Pneumonitis        Subjective:  Patient making improvement. Still short of breath with activity. +cough           Objective:  Review of Systems:   ROS completed; pertinent positive and negatives stated in subjective.     Vital signs:  Temp:  [97.7 °F (36.5 °C)-98 °F (36.7 °C)] 97.8 °F (36.6 °C)  Pulse:  [71-87] 71  Resp:  [18] 18  BP: (112-136)/(67-75) 112/67  SpO2:  [91 %-94 %] 94 %     Physical Exam:    General: No acute distress  Respiratory: Diminished right base but with increased aeration   Cardiovascular: S1, S2.  Abdomen: Soft  Neuro: No new focal deficits     Diagnostic Data:    Labs:        Recent Labs   Lab 09/27/24  1149 10/02/24  0616 10/03/24  0651   WBC 9.3 15.6*  17.5*   HGB 15.7 16.2 16.1   MCV 86.5 86.8 87.7   .0 397.0 369.0                Recent Labs   Lab 24  1149 24  0949 10/02/24  0616 10/03/24  0651   * 218* 157* 126*   BUN 8* 15 16 17   CREATSERUM 0.90 0.97 0.81 0.81   CA 9.6 9.3 9.1 8.9   ALB 4.5  --  4.1  --     137 137 136   K 3.7 4.1 4.4 4.2    105 105 104   CO2 25.0 23.0 23.0 24.0   ALKPHO 87  --  66  --    AST 30  --  12  --    ALT 31  --  58*  --    BILT 0.4  --  0.5  --    TP 7.8  --  6.8  --          Estimated Creatinine Clearance: 127.8 mL/min (based on SCr of 0.81 mg/dL).      No results for input(s): \"TROP\", \"TROPHS\", \"CK\" in the last 168 hours.     No results for input(s): \"PTP\", \"INR\" in the last 168 hours.           Imaging: Imaging data reviewed in Epic.     Medications:   Scheduled Medications    methylPREDNISolone  40 mg Intravenous Q12H    albuterol  2.5 mg Nebulization QID    enoxaparin  40 mg Subcutaneous Daily    guaiFENesin ER  1,200 mg Oral BID    benzonatate  200 mg Oral TID    budesonide  0.5 mg Nebulization 2 times daily         Assessment & Plan:  #Acute hypoxic respiratory failure d/t pneumonitis from recent viral infection vs pneumonia, MRSA nares neg, RVP neg, Legionella neg, ECHO EF: 60-65%  -IV steroids  -Nebs  -Antitussives  -Oxygen - wean as able, currently 4L  -CXR pending   -Incentive spirometry, flutter  -Will need repeat imaging as outpatient   -Pulmonary consult     #Steroid induced hyperglycemia     Plan of care discussed with patient.      Mike ASHLEY     10/3/2024 Pulmonology Progress Note   Ben Lanza Patient Status:  Inpatient    1985 MRN [id number]   [facility] 5NW-A Attending Gabriele Stewart MD   Hosp Day # 6 PCP Larry Pisano MD          Chief Complaint   Patient presents with    Difficulty Breathing         History of Present Illness:  Continues on 4 L/min by nasal cannula.  Oxygen saturations are 94%.  Feeling much better overall but still with  reduced cough     History:  Past Medical History       Past Medical History:    Childhood asthma (HCC)         Past Surgical History   History reviewed. No pertinent surgical history.     Family History         Family History   Problem Relation Age of Onset    Hypertension Father      Anxiety Brother            reports that he has quit smoking. His smoking use included cigarettes. He has never used smokeless tobacco. He reports current alcohol use of about 12.0 standard drinks of alcohol per week. He reports that he does not currently use drugs.     Allergies:  Allergies   No Known Allergies        Medications:    Current Hospital Medications      Current Facility-Administered Medications:     methylPREDNISolone sodium succinate (Solu-MEDROL) injection 40 mg, 40 mg, Intravenous, Q12H    temazepam (Restoril) cap 15 mg, 15 mg, Oral, Nightly PRN    albuterol (Ventolin) (2.5 MG/3ML) 0.083% nebulizer solution 2.5 mg, 2.5 mg, Nebulization, QID    acetaminophen (Tylenol Extra Strength) tab 1,000 mg, 1,000 mg, Oral, Q4H PRN    melatonin tab 3 mg, 3 mg, Oral, Nightly PRN    glycerin-hypromellose- (Artificial Tears) 0.2-0.2-1 % ophthalmic solution 1 drop, 1 drop, Both Eyes, QID PRN    sodium chloride (Saline Mist) 0.65 % nasal solution 1 spray, 1 spray, Each Nare, Q3H PRN    enoxaparin (Lovenox) 40 MG/0.4ML SUBQ injection 40 mg, 40 mg, Subcutaneous, Daily    ondansetron (Zofran) 4 MG/2ML injection 4 mg, 4 mg, Intravenous, Q6H PRN    prochlorperazine (Compazine) 10 MG/2ML injection 5 mg, 5 mg, Intravenous, Q8H PRN    polyethylene glycol (PEG 3350) (Miralax) 17 g oral packet 17 g, 17 g, Oral, Daily PRN    sennosides (Senokot) tab 17.2 mg, 17.2 mg, Oral, Nightly PRN    bisacodyl (Dulcolax) 10 MG rectal suppository 10 mg, 10 mg, Rectal, Daily PRN    fleet enema (Fleet) rectal enema 133 mL, 1 enema, Rectal, Once PRN    guaiFENesin ER (Mucinex) 12 hr tab 1,200 mg, 1,200 mg, Oral, BID    benzonatate (Tessalon) cap 200 mg, 200  mg, Oral, TID    HYDROcodone-homatropine (Hydromet) 5-1.5 MG/5ML oral syrup 5 mL, 5 mL, Oral, Q4H PRN    budesonide (Pulmicort) 0.5 MG/2ML nebulizer suspension 0.5 mg, 0.5 mg, Nebulization, 2 times daily        Intake/Output:  No intake or output data in the 24 hours ending 10/03/24 1024           Review of Systems     Review of Systems: A comprehensive 10 point review of systems was completed.  Pertinent positives and negatives noted in the the HPI.            Patient Vitals for the past 24 hrs:    BP Temp Temp src Pulse Resp SpO2   09/30/24 1214 118/65 98 °F (36.7 °C) Oral 87 18 95 %   09/30/24 0810 131/80 97.8 °F (36.6 °C) Oral 89 20 94 %   09/30/24 0720 -- -- -- -- -- 94 %   09/30/24 0604 117/74 98.1 °F (36.7 °C) Oral 66 18 92 %   09/29/24 1941 131/83 98 °F (36.7 °C) Oral 81 18 95 %      Vitals          Vitals:     10/02/24 0750 10/02/24 1131 10/02/24 2000 10/03/24 0357   BP:   131/69 136/75 112/67   BP Location:   Left arm Left arm Left arm   Pulse:   84 87 71   Resp:   18 18 18   Temp:   97.7 °F (36.5 °C) 98 °F (36.7 °C) 97.8 °F (36.6 °C)   TempSrc:   Oral Oral Oral   SpO2: 92% 91% 92% 94%   Weight:           Height:                 Body mass index is 32.07 kg/m².      Physical Exam  Constitutional:       General: He is not in acute distress.     Appearance: Normal appearance. He is not ill-appearing or diaphoretic.   HENT:      Head: Normocephalic and atraumatic.      Nose: Nose normal. No congestion or rhinorrhea.      Mouth/Throat:      Mouth: Mucous membranes are moist.      Pharynx: Oropharynx is clear. No oropharyngeal exudate or posterior oropharyngeal erythema.   Eyes:      Extraocular Movements: Extraocular movements intact.      Pupils: Pupils are equal, round, and reactive to light.   Cardiovascular:      Rate and Rhythm: Normal rate.      Pulses: Normal pulses.      Heart sounds: Normal heart sounds. No murmur heard.  Pulmonary:      Effort: Pulmonary effort is normal. No respiratory distress.       Breath sounds: Normal breath sounds. No wheezing or rhonchi.      Comments: Crackles at right lower lobe zones greater than left lung base but with improved air entry  Chest:      Chest wall: No tenderness.   Abdominal:      General: Abdomen is flat. Bowel sounds are normal.      Palpations: Abdomen is soft.   Musculoskeletal:         General: Normal range of motion.   Skin:     General: Skin is warm.   Neurological:      General: No focal deficit present.      Mental Status: He is alert and oriented to person, place, and time.   Psychiatric:         Mood and Affect: Mood normal.         Behavior: Behavior normal.         Thought Content: Thought content normal.         Judgment: Judgment normal.               Lab Data Review:           Recent Labs   Lab 09/27/24  1149 10/02/24  0616 10/03/24  0651   WBC 9.3 15.6* 17.5*   HGB 15.7 16.2 16.1   HCT 46.1 48.0 47.9   .0 397.0 369.0                Recent Labs   Lab 09/27/24  1149 09/30/24  0949 10/02/24  0616 10/03/24  0651    137 137 136   K 3.7 4.1 4.4 4.2    105 105 104   CO2 25.0 23.0 23.0 24.0   BUN 8* 15 16 17   CREATSERUM 0.90 0.97 0.81 0.81   CA 9.6 9.3 9.1 8.9   ALB 4.5  --  4.1  --    ALKPHO 87  --  66  --    ALT 31  --  58*  --    AST 30  --  12  --    * 218* 157* 126*             Recent Labs   Lab 09/30/24  0949   MG 2.2               Lab Results   Component Value Date     PHOS 3.7 09/30/2024         No results for input(s): \"PT\", \"INR\", \"PTT\" in the last 168 hours.         Recent Labs   Lab 09/28/24  1054   ABGPHT 7.50*   YZQDFL4Q 32*   USEJX7P 62*   ABGHCO3 26.7   ABGBE 2.5*   TEMP 98.6   TERRELL Positive   SITE Left Radial   DEV High flow nasal cannula   THGB 15.4         No results for input(s): \"TROP\", \"CKMB\" in the last 168 hours.        Cultures:         Hospital Encounter on 09/27/24   1. Blood Culture     Status: None     Collection Time: 09/27/24 11:49 AM     Specimen: Blood,peripheral   Result Value Ref Range     Blood  Culture Result No Growth 5 Days N/A          Transthoracic Echocardiogram     Name:Ben Lanza     Date: 10/01/2024 :  1985 Ht:  (71in)  BP: 132 / 85   MRN:  [id number]     Age:  39years    Wt:  (229lb) HR: 85bpm   Loc:  [facility]       Gndr: M          BSA: 2.23m^2   Sonographer: LAUREN Garza MS     Ordering:    Gabriele Stewart     ----------------------------------------------------------------------------   History/Indications: Hypoxia.     ----------------------------------------------------------------------------   Procedure information:  A transthoracic complete 2D study was performed.   Additional evaluation included M-mode, complete spectral Doppler, and color   Doppler.  Patient status:  Inpatient.  Location:  [place] [address].    No prior   study was available for comparison.    This was a routine study.   Transthoracic echocardiography for ventricular function evaluation and   assessment of valvular function. Image quality was adequate.     ECG rhythm:   Normal sinus     ----------------------------------------------------------------------------     Conclusions:     1. Left ventricle: The cavity size was normal. Wall thickness was normal.      Systolic function was normal. The estimated ejection fraction was 60-65%,      by visual assessment. No diagnostic evidence for regional wall motion      abnormalities.   2. Right ventricle: The cavity size was normal. Systolic function was      normal.   3. No significant valvular heart disease (stenosis or regurgitation)   Impressions:  No previous study from [facility] was   available for comparison.     Radiology personally reviewed:  XR CHEST AP PORTABLE  (CPT=71045)     Result Date: 10/3/2024  CONCLUSION:  See above   LOCATION:  [facility]      Dictated by (CST): Reno Young MD on 10/03/2024 at 8:56 AM     Finalized by (CST): Reno Young MD on 10/03/2024 at 8:56 AM              Patient Active Problem List   Diagnosis    Hypoxia    Pneumonia of  both lungs due to infectious organism, unspecified part of lung    Acute hypoxemic respiratory failure (HCC)    Severe persistent asthma with exacerbation (HCC)    Therapeutic drug monitoring      Assessment:  Acute hypoxic respiratory failure suspect related to postviral pneumonitis versus atypical infection: FiO2 weaned to 4 L/min via high flow nasal cannula and oxygen saturation 94%  Bilateral lower lobe patchy consolidation in recent influenza infection: With bilateral upper lobe patchy groundglass/mosaic attenuation density suggesting multifocal pneumonia though cannot rule out interstitial lung disease, strep pneumonia antigen negative ESR was elevated, QuantiFERON TB Gold test was negative, respiratory expanded to panel with COVID was negative including mycoplasma and chlamydia PCR, Legionella urinary antigen negative,: Fungitell D panel negative proBNP normal range and echocardiogram showed adequate left ventricular ejection fraction.  Radiologically improved aeration in bilateral lower lobes and patient less dyspneic, CRP normal range now  Shortness of breath: Improving  Asthma with acute exacerbation: Improving  Leukocytosis: Worsened  History of tobacco use        Plan:  Wean FiO2 to keep oxygen saturations between 90% to 92%  Completed antibiotic course  Budesonide 0.5 mg nebulizers every 12 hours  Add Brovana 15 mcg nebulizer every 12 hours  Continue albuterol 2.5 mg nebulizers every 4 hours as needed  Continue Solu-Medrol 40 mg IV every 12 hours  Benzonatate 200 mg 3 times a day as needed for cough  DVT prophylaxis:   Lovenox 40 mg subcutaneous every 24 hours  GI prophylaxis: --  Will follow for further recommendations  Follow labs, CRP,     Thank You for allowing me to participate in this patient's care      Richy Metz MD           MEDICATIONS ADMINISTERED IN LAST 1 DAY:  albuterol (Ventolin) (2.5 MG/3ML) 0.083% nebulizer solution 2.5 mg       Date Action Dose Route User    10/3/2024 1127 Given 2.5  mg Nebulization Magdaleno Raymond RCP          albuterol (Ventolin) (2.5 MG/3ML) 0.083% nebulizer solution 2.5 mg       Date Action Dose Route User    10/3/2024 2019 Given 2.5 mg Nebulization Nicolas Diaz RCP          arformoterol (Brovana) 15 MCG/2ML nebulizer solution 15 mcg       Date Action Dose Route User    10/4/2024 0845 Given 15 mcg Nebulization Sugey Mae RCP    10/3/2024 2020 Given 15 mcg Nebulization Nicolas Diaz RCP          benzonatate (Tessalon) cap 200 mg       Date Action Dose Route User    10/4/2024 0839 Given 200 mg Oral Viktoriya Hui RN    10/3/2024 2010 Given 200 mg Oral Namita Beltran RN    10/3/2024 1412 Given 200 mg Oral Sonia Hatfield RN          budesonide (Pulmicort) 0.5 MG/2ML nebulizer suspension 0.5 mg       Date Action Dose Route User    10/3/2024 2020 Given 0.5 mg Nebulization Nicolas Diaz RCP          enoxaparin (Lovenox) 40 MG/0.4ML SUBQ injection 40 mg       Date Action Dose Route User    10/3/2024 1819 Given 40 mg Subcutaneous (Left Lower Abdomen) Sonia Hatfield RN          guaiFENesin ER (Mucinex) 12 hr tab 1,200 mg       Date Action Dose Route User    10/3/2024 1207 Given 1,200 mg Oral Sonia Hatfield RN          melatonin tab 3 mg       Date Action Dose Route User    10/3/2024 2010 Given 3 mg Oral Namita Beltran RN          methylPREDNISolone sodium succinate (Solu-MEDROL) injection 40 mg       Date Action Dose Route User    10/4/2024 0839 Given 40 mg Intravenous Viktoriya Hui RN    10/3/2024 2010 Given 40 mg Intravenous Namita Beltran RN          temazepam (Restoril) cap 15 mg       Date Action Dose Route User    10/3/2024 2010 Given 15 mg Oral Namita Beltran RN            Vitals (last day)       Date/Time Temp Pulse Resp BP SpO2 Weight O2 Device O2 Flow Rate (L/min) [facility]    10/04/24 0532 97.8 °F (36.6 °C) 68 18 115/79 93 % -- High flow nasal cannula 4 L/min KM    10/03/24 2020 -- -- -- -- -- -- High flow nasal cannula 4 L/min AG    10/03/24 2020 97.8  °F (36.6 °C) 74 18 143/86 93 % -- -- -- SC    10/03/24 1923 97.8 °F (36.6 °C) 79 18 114/55 93 % -- High flow nasal cannula 4 L/min KM    10/03/24 0743 -- -- -- -- -- -- High flow nasal cannula 4 L/min MA    10/03/24 0357 97.8 °F (36.6 °C) 71 18 112/67 94 % -- High flow nasal cannula 4 L/min           CIWA Scores (since admission)       None

## 2024-10-04 NOTE — PROGRESS NOTES
[facility]  Pulmonary/Critical Care/Sleep Medicine Progress note    Ben Lanza Patient Status:  Inpatient    1985 MRN [id number]   Location [facility] 5NW-A Attending Gabriele Stewart MD   Hosp Day # 7 PCP Larry Pisano MD     Chief Complaint   Patient presents with    Difficulty Breathing        History of Present Illness:    Supplemental oxygen weaned to 2 L/min by nasal cannula and oxygen saturations are 92 percent.    History:  Past Medical History:    Childhood asthma (HCC)     History reviewed. No pertinent surgical history.  Family History   Problem Relation Age of Onset    Hypertension Father     Anxiety Brother       reports that he has quit smoking. His smoking use included cigarettes. He has never used smokeless tobacco. He reports current alcohol use of about 12.0 standard drinks of alcohol per week. He reports that he does not currently use drugs.    Allergies:  No Known Allergies    Medications:    Current Facility-Administered Medications:     albuterol (Ventolin) (2.5 MG/3ML) 0.083% nebulizer solution 2.5 mg, 2.5 mg, Nebulization, Q4H PRN    arformoterol (Brovana) 15 MCG/2ML nebulizer solution 15 mcg, 15 mcg, Nebulization, 2 times daily    methylPREDNISolone sodium succinate (Solu-MEDROL) injection 40 mg, 40 mg, Intravenous, Q12H    temazepam (Restoril) cap 15 mg, 15 mg, Oral, Nightly PRN    acetaminophen (Tylenol Extra Strength) tab 1,000 mg, 1,000 mg, Oral, Q4H PRN    melatonin tab 3 mg, 3 mg, Oral, Nightly PRN    glycerin-hypromellose- (Artificial Tears) 0.2-0.2-1 % ophthalmic solution 1 drop, 1 drop, Both Eyes, QID PRN    sodium chloride (Saline Mist) 0.65 % nasal solution 1 spray, 1 spray, Each Nare, Q3H PRN    enoxaparin (Lovenox) 40 MG/0.4ML SUBQ injection 40 mg, 40 mg, Subcutaneous, Daily    ondansetron (Zofran) 4 MG/2ML injection 4 mg, 4 mg, Intravenous, Q6H PRN    prochlorperazine (Compazine) 10 MG/2ML injection 5 mg, 5 mg, Intravenous, Q8H PRN     polyethylene glycol (PEG 3350) (Miralax) 17 g oral packet 17 g, 17 g, Oral, Daily PRN    sennosides (Senokot) tab 17.2 mg, 17.2 mg, Oral, Nightly PRN    bisacodyl (Dulcolax) 10 MG rectal suppository 10 mg, 10 mg, Rectal, Daily PRN    fleet enema (Fleet) rectal enema 133 mL, 1 enema, Rectal, Once PRN    guaiFENesin ER (Mucinex) 12 hr tab 1,200 mg, 1,200 mg, Oral, BID    benzonatate (Tessalon) cap 200 mg, 200 mg, Oral, TID    HYDROcodone-homatropine (Hydromet) 5-1.5 MG/5ML oral syrup 5 mL, 5 mL, Oral, Q4H PRN    budesonide (Pulmicort) 0.5 MG/2ML nebulizer suspension 0.5 mg, 0.5 mg, Nebulization, 2 times daily    Intake/Output:  No intake or output data in the 24 hours ending 10/04/24 0923         Review of Systems    Review of Systems: A comprehensive 10 point review of systems was completed.  Pertinent positives and negatives noted in the the HPI.         Patient Vitals for the past 24 hrs:   BP Temp Temp src Pulse Resp SpO2   09/30/24 1214 118/65 98 °F (36.7 °C) Oral 87 18 95 %   09/30/24 0810 131/80 97.8 °F (36.6 °C) Oral 89 20 94 %   09/30/24 0720 -- -- -- -- -- 94 %   09/30/24 0604 117/74 98.1 °F (36.7 °C) Oral 66 18 92 %   09/29/24 1941 131/83 98 °F (36.7 °C) Oral 81 18 95 %     Vitals:    10/03/24 1923 10/03/24 2020 10/04/24 0532 10/04/24 0850   BP: 114/55 143/86 115/79    BP Location: Right arm Right arm Right arm    Pulse: 79 74 68    Resp: 18 18 18    Temp: 97.8 °F (36.6 °C) 97.8 °F (36.6 °C) 97.8 °F (36.6 °C)    TempSrc: Oral Oral Oral    SpO2: 93% 93% 93% 94%   Weight:       Height:          Body mass index is 32.07 kg/m².     Physical Exam  Constitutional:       General: He is not in acute distress.     Appearance: Normal appearance. He is not ill-appearing or diaphoretic.   HENT:      Head: Normocephalic and atraumatic.      Nose: Nose normal. No congestion or rhinorrhea.      Mouth/Throat:      Mouth: Mucous membranes are moist.      Pharynx: Oropharynx is clear. No oropharyngeal exudate or posterior  oropharyngeal erythema.   Eyes:      Extraocular Movements: Extraocular movements intact.      Pupils: Pupils are equal, round, and reactive to light.   Cardiovascular:      Rate and Rhythm: Normal rate.      Pulses: Normal pulses.      Heart sounds: Normal heart sounds. No murmur heard.  Pulmonary:      Effort: Pulmonary effort is normal. No respiratory distress.      Breath sounds: Normal breath sounds. No wheezing or rhonchi.      Comments: Crackles at right lower lobe zones greater than left lung base but with improved air entry  Chest:      Chest wall: No tenderness.   Abdominal:      General: Abdomen is flat. Bowel sounds are normal.      Palpations: Abdomen is soft.   Musculoskeletal:         General: Normal range of motion.   Skin:     General: Skin is warm.   Neurological:      General: No focal deficit present.      Mental Status: He is alert and oriented to person, place, and time.   Psychiatric:         Mood and Affect: Mood normal.         Behavior: Behavior normal.         Thought Content: Thought content normal.         Judgment: Judgment normal.           Lab Data Review:    Recent Labs   Lab 09/27/24  1149 10/02/24  0616 10/03/24  0651   WBC 9.3 15.6* 17.5*   HGB 15.7 16.2 16.1   HCT 46.1 48.0 47.9   .0 397.0 369.0       Recent Labs   Lab 09/27/24  1149 09/30/24  0949 10/02/24  0616 10/03/24  0651    137 137 136   K 3.7 4.1 4.4 4.2    105 105 104   CO2 25.0 23.0 23.0 24.0   BUN 8* 15 16 17   CREATSERUM 0.90 0.97 0.81 0.81   CA 9.6 9.3 9.1 8.9   ALB 4.5  --  4.1  --    ALKPHO 87  --  66  --    ALT 31  --  58*  --    AST 30  --  12  --    * 218* 157* 126*       Recent Labs   Lab 09/30/24  0949   MG 2.2       Lab Results   Component Value Date    PHOS 3.7 09/30/2024        No results for input(s): \"PT\", \"INR\", \"PTT\" in the last 168 hours.    Recent Labs   Lab 09/28/24  1054   ABGPHT 7.50*   XKYCBM7T 32*   NENGC4Y 62*   ABGHCO3 26.7   ABGBE 2.5*   TEMP 98.6   TERRELL Positive    SITE Left Radial   DEV High flow nasal cannula   THGB 15.4       No results for input(s): \"TROP\", \"CKMB\" in the last 168 hours.      Cultures:   Hospital Encounter on 24   1. Blood Culture     Status: None    Collection Time: 24 11:49 AM    Specimen: Blood,peripheral   Result Value Ref Range    Blood Culture Result No Growth 5 Days N/A        Transthoracic Echocardiogram     Name:Ben Lanza     Date: 10/01/2024 :  1985 Ht:  (71in)  BP: 132 / 85   MRN:  [id number]     Age:  39years    Wt:  (229lb) HR: 85bpm   Loc:  [facility]       Gndr: M          BSA: 2.23m^2   Sonographer: LAUREN Garza MS     Ordering:    Gabriele Stewart     ----------------------------------------------------------------------------   History/Indications: Hypoxia.     ----------------------------------------------------------------------------   Procedure information:  A transthoracic complete 2D study was performed.   Additional evaluation included M-mode, complete spectral Doppler, and color   Doppler.  Patient status:  Inpatient.  Location:  [place] [address].    No prior   study was available for comparison.    This was a routine study.   Transthoracic echocardiography for ventricular function evaluation and   assessment of valvular function. Image quality was adequate.     ECG rhythm:   Normal sinus     ----------------------------------------------------------------------------     Conclusions:     1. Left ventricle: The cavity size was normal. Wall thickness was normal.      Systolic function was normal. The estimated ejection fraction was 60-65%,      by visual assessment. No diagnostic evidence for regional wall motion      abnormalities.   2. Right ventricle: The cavity size was normal. Systolic function was      normal.   3. No significant valvular heart disease (stenosis or regurgitation)   Impressions:  No previous study from [facility] was   available for comparison.     Radiology personally reviewed:  XR  CHEST AP PORTABLE  (CPT=71045)    Result Date: 10/3/2024  CONCLUSION:  See above   LOCATION:  [facility]      Dictated by (CST): Reno Young MD on 10/03/2024 at 8:56 AM     Finalized by (CST): Reno Young MD on 10/03/2024 at 8:56 AM         Patient Active Problem List   Diagnosis    Hypoxia    Pneumonia of both lungs due to infectious organism, unspecified part of lung    Acute hypoxemic respiratory failure (HCC)    Severe persistent asthma with exacerbation (HCC)    Therapeutic drug monitoring     Assessment:  Acute hypoxic respiratory failure suspect related to postviral pneumonitis versus atypical infection: FiO2 weaned to 2 L/min via high flow nasal cannula    Bilateral lower lobe patchy consolidation in recent influenza infection: With bilateral upper lobe patchy groundglass/mosaic attenuation density suggesting multifocal pneumonia though cannot rule out interstitial lung disease, strep pneumonia antigen negative ESR was elevated, QuantiFERON TB Gold test was negative, respiratory expanded to panel with COVID was negative including mycoplasma and chlamydia PCR, Legionella urinary antigen negative,: Fungitell D panel negative proBNP normal range and echocardiogram showed adequate left ventricular ejection fraction.  Radiologically improved aeration in bilateral lower lobes and patient less dyspneic, CRP normal range now  Shortness of breath: Improving  Asthma with acute exacerbation: Improving  Leukocytosis: Worsened  History of tobacco use      Plan:  Wean FiO2 to keep oxygen saturations between 90% to 92%  Completed antibiotic course  Budesonide 0.5 mg nebulizers every 12 hours  Continue Brovana 15 mcg nebulizer every 12 hours  Continue albuterol 2.5 mg nebulizers every 4 hours as needed  Discontinue Solu-Medrol 40 mg IV every 12 hours  Prednisone 40 mg oral daily  Benzonatate 200 mg 3 times a day as needed for cough  DVT prophylaxis:   Lovenox 40 mg subcutaneous every 24 hours  GI prophylaxis: --  Will  follow for further recommendations  Hopefully home soon    Thank You for allowing me to participate in this patient's care     Richy Metz MD      Note to the patient: The 21st Century Cures Act makes medical notes like these available to patients in the interest of transparency. However, be advised that this is a medical document. It is intended as peer to peer communication. It is written in medical language and may contain abbreviations or verbiage that are unfamiliar. It may appear blunt or direct. Medical documents are intended to carry relevant information, facts as evident, and clinical opinion of the practitioner.      Disclaimer: Components of this note were documented using voice recognition system and are subject to errors not corrected at proofreading. Contact the author of this note for any clarifications

## 2024-10-05 VITALS
DIASTOLIC BLOOD PRESSURE: 54 MMHG | HEART RATE: 89 BPM | OXYGEN SATURATION: 91 % | SYSTOLIC BLOOD PRESSURE: 104 MMHG | HEIGHT: 71 IN | RESPIRATION RATE: 20 BRPM | WEIGHT: 229.94 LBS | TEMPERATURE: 99 F | BODY MASS INDEX: 32.19 KG/M2

## 2024-10-05 PROCEDURE — 99233 SBSQ HOSP IP/OBS HIGH 50: CPT | Performed by: INTERNAL MEDICINE

## 2024-10-05 PROCEDURE — 99239 HOSP IP/OBS DSCHRG MGMT >30: CPT | Performed by: HOSPITALIST

## 2024-10-05 RX ORDER — PREDNISONE 10 MG/1
40 TABLET ORAL
Qty: 30 TABLET | Refills: 0 | Status: SHIPPED | OUTPATIENT
Start: 2024-10-06

## 2024-10-05 RX ORDER — ALBUTEROL SULFATE 90 UG/1
2 INHALANT RESPIRATORY (INHALATION) 4 TIMES DAILY
Status: DISCONTINUED | OUTPATIENT
Start: 2024-10-05 | End: 2024-10-05

## 2024-10-05 RX ORDER — BENZONATATE 200 MG/1
200 CAPSULE ORAL 3 TIMES DAILY PRN
Qty: 30 CAPSULE | Refills: 0 | Status: SHIPPED | OUTPATIENT
Start: 2024-10-05

## 2024-10-05 RX ORDER — FLUTICASONE PROPIONATE AND SALMETEROL 500; 50 UG/1; UG/1
1 POWDER RESPIRATORY (INHALATION) 2 TIMES DAILY
Qty: 60 EACH | Refills: 0 | Status: SHIPPED | OUTPATIENT
Start: 2024-10-05 | End: 2024-10-10

## 2024-10-05 RX ORDER — FLUTICASONE PROPIONATE AND SALMETEROL 500; 50 UG/1; UG/1
1 POWDER RESPIRATORY (INHALATION) 2 TIMES DAILY
Status: DISCONTINUED | OUTPATIENT
Start: 2024-10-05 | End: 2024-10-05

## 2024-10-05 NOTE — PROGRESS NOTES
[facility]  Pulmonary/Critical Care/Sleep Medicine Progress note    Ben Lanza Patient Status:  Inpatient    1985 MRN [id number]   Location [facility] 5NW-A Attending Gabriele Stewart MD   Hosp Day # 8 PCP Larry Pisano MD     Chief Complaint   Patient presents with    Difficulty Breathing        History of Present Illness:    Weaned to room air.  Feels well.    History:  Past Medical History:    Childhood asthma (HCC)     History reviewed. No pertinent surgical history.  Family History   Problem Relation Age of Onset    Hypertension Father     Anxiety Brother       reports that he has quit smoking. His smoking use included cigarettes. He has never used smokeless tobacco. He reports current alcohol use of about 12.0 standard drinks of alcohol per week. He reports that he does not currently use drugs.    Allergies:  No Known Allergies    Medications:    Current Facility-Administered Medications:     predniSONE (Deltasone) tab 40 mg, 40 mg, Oral, Daily with breakfast    albuterol (Ventolin) (2.5 MG/3ML) 0.083% nebulizer solution 2.5 mg, 2.5 mg, Nebulization, Q4H PRN    arformoterol (Brovana) 15 MCG/2ML nebulizer solution 15 mcg, 15 mcg, Nebulization, 2 times daily    temazepam (Restoril) cap 15 mg, 15 mg, Oral, Nightly PRN    acetaminophen (Tylenol Extra Strength) tab 1,000 mg, 1,000 mg, Oral, Q4H PRN    melatonin tab 3 mg, 3 mg, Oral, Nightly PRN    glycerin-hypromellose- (Artificial Tears) 0.2-0.2-1 % ophthalmic solution 1 drop, 1 drop, Both Eyes, QID PRN    sodium chloride (Saline Mist) 0.65 % nasal solution 1 spray, 1 spray, Each Nare, Q3H PRN    enoxaparin (Lovenox) 40 MG/0.4ML SUBQ injection 40 mg, 40 mg, Subcutaneous, Daily    ondansetron (Zofran) 4 MG/2ML injection 4 mg, 4 mg, Intravenous, Q6H PRN    prochlorperazine (Compazine) 10 MG/2ML injection 5 mg, 5 mg, Intravenous, Q8H PRN    polyethylene glycol (PEG 3350) (Miralax) 17 g oral packet 17 g, 17 g, Oral, Daily PRN     sennosides (Senokot) tab 17.2 mg, 17.2 mg, Oral, Nightly PRN    bisacodyl (Dulcolax) 10 MG rectal suppository 10 mg, 10 mg, Rectal, Daily PRN    fleet enema (Fleet) rectal enema 133 mL, 1 enema, Rectal, Once PRN    guaiFENesin ER (Mucinex) 12 hr tab 1,200 mg, 1,200 mg, Oral, BID    benzonatate (Tessalon) cap 200 mg, 200 mg, Oral, TID    HYDROcodone-homatropine (Hydromet) 5-1.5 MG/5ML oral syrup 5 mL, 5 mL, Oral, Q4H PRN    budesonide (Pulmicort) 0.5 MG/2ML nebulizer suspension 0.5 mg, 0.5 mg, Nebulization, 2 times daily    Intake/Output:  No intake or output data in the 24 hours ending 10/05/24 1036         Review of Systems    Review of Systems: A comprehensive 10 point review of systems was completed.  Pertinent positives and negatives noted in the the HPI.           Vitals:    10/04/24 1941 10/04/24 2105 10/05/24 0500 10/05/24 0806   BP: 135/79  104/54    BP Location: Right arm  Right arm    Pulse: 90  66 89   Resp: 20  19 20   Temp: 97.9 °F (36.6 °C)  98.5 °F (36.9 °C)    TempSrc: Oral  Oral    SpO2: 93% 93% 94% 91%   Weight:       Height:          Body mass index is 32.07 kg/m².     Physical Exam  Constitutional:       General: He is not in acute distress.     Appearance: Normal appearance. He is not ill-appearing or diaphoretic.   HENT:      Head: Normocephalic and atraumatic.      Nose: Nose normal. No congestion or rhinorrhea.      Mouth/Throat:      Mouth: Mucous membranes are moist.      Pharynx: Oropharynx is clear. No oropharyngeal exudate or posterior oropharyngeal erythema.   Eyes:      Extraocular Movements: Extraocular movements intact.      Pupils: Pupils are equal, round, and reactive to light.   Cardiovascular:      Rate and Rhythm: Normal rate.      Pulses: Normal pulses.      Heart sounds: Normal heart sounds. No murmur heard.  Pulmonary:      Effort: Pulmonary effort is normal. No respiratory distress.      Breath sounds: Normal breath sounds. No wheezing or rhonchi.      Comments: Crackles at  right lower lobe zones greater than left lung base but with improved air entry  Chest:      Chest wall: No tenderness.   Abdominal:      General: Abdomen is flat. Bowel sounds are normal.      Palpations: Abdomen is soft.   Musculoskeletal:         General: Normal range of motion.   Skin:     General: Skin is warm.   Neurological:      General: No focal deficit present.      Mental Status: He is alert and oriented to person, place, and time.   Psychiatric:         Mood and Affect: Mood normal.         Behavior: Behavior normal.         Thought Content: Thought content normal.         Judgment: Judgment normal.           Lab Data Review:    Recent Labs   Lab 10/02/24  0616 10/03/24  0651   WBC 15.6* 17.5*   HGB 16.2 16.1   HCT 48.0 47.9   .0 369.0       Recent Labs   Lab 24  0949 10/02/24  0616 10/03/24  0651    137 136   K 4.1 4.4 4.2    105 104   CO2 23.0 23.0 24.0   BUN 15 16 17   CREATSERUM 0.97 0.81 0.81   CA 9.3 9.1 8.9   ALB  --  4.1  --    ALKPHO  --  66  --    ALT  --  58*  --    AST  --  12  --    * 157* 126*       Recent Labs   Lab 24  0949   MG 2.2       Lab Results   Component Value Date    PHOS 3.7 2024        No results for input(s): \"PT\", \"INR\", \"PTT\" in the last 168 hours.    Recent Labs   Lab 24  1054   ABGPHT 7.50*   LRKSCE5E 32*   EQOHO7K 62*   ABGHCO3 26.7   ABGBE 2.5*   TEMP 98.6   TERRELL Positive   SITE Left Radial   DEV High flow nasal cannula   THGB 15.4       No results for input(s): \"TROP\", \"CKMB\" in the last 168 hours.      Cultures:   Hospital Encounter on 24   1. Blood Culture     Status: None    Collection Time: 24 11:49 AM    Specimen: Blood,peripheral   Result Value Ref Range    Blood Culture Result No Growth 5 Days N/A        Transthoracic Echocardiogram     Name:Ben Lanza ANA CRISTINA     Date: 10/01/2024 :  1985 Ht:  (71in)  BP: 132 / 85   MRN:  [id number]     Age:  39years    Wt:  (229lb) HR: 85bpm   Loc:  [facility]        Gndr: M          BSA: 2.23m^2   Sonographer: LAUREN Garza MS     Ordering:    Pat Gabriele     ----------------------------------------------------------------------------   History/Indications: Hypoxia.     ----------------------------------------------------------------------------   Procedure information:  A transthoracic complete 2D study was performed.   Additional evaluation included M-mode, complete spectral Doppler, and color   Doppler.  Patient status:  Inpatient.  Location:  [place] [address].    No prior   study was available for comparison.    This was a routine study.   Transthoracic echocardiography for ventricular function evaluation and   assessment of valvular function. Image quality was adequate.     ECG rhythm:   Normal sinus     ----------------------------------------------------------------------------     Conclusions:     1. Left ventricle: The cavity size was normal. Wall thickness was normal.      Systolic function was normal. The estimated ejection fraction was 60-65%,      by visual assessment. No diagnostic evidence for regional wall motion      abnormalities.   2. Right ventricle: The cavity size was normal. Systolic function was      normal.   3. No significant valvular heart disease (stenosis or regurgitation)   Impressions:  No previous study from [facility] was   available for comparison.     Radiology personally reviewed:  No results found.     Patient Active Problem List   Diagnosis    Hypoxia    Pneumonia of both lungs due to infectious organism, unspecified part of lung    Acute hypoxemic respiratory failure (HCC)    Severe persistent asthma with exacerbation (HCC)    Therapeutic drug monitoring     Assessment:  Acute hypoxic respiratory failure suspect related to postviral pneumonitis versus atypical infection: Weaned to room air.:  Resolved  Bilateral lower lobe patchy consolidation in recent influenza infection: With bilateral upper lobe patchy groundglass/mosaic  attenuation density suggesting multifocal pneumonia though cannot rule out interstitial lung disease, strep pneumonia antigen negative ESR was elevated, QuantiFERON TB Gold test was negative, respiratory expanded to panel with COVID was negative including mycoplasma and chlamydia PCR, Legionella urinary antigen negative,: Fungitell D panel negative proBNP normal range and echocardiogram showed adequate left ventricular ejection fraction.  Radiologically improved aeration in bilateral lower lobes and patient less dyspneic, CRP normal range now  Shortness of breath: Improving  Asthma with acute exacerbation: Improving  Leukocytosis: Worsened  History of tobacco use      Plan:  Wean FiO2 to keep oxygen saturations between 90% to 92%  Completed Augmentin course  Discontinue budesonide 0.5 mg nebulizers every 12 hours  Discontinue Brovana 15 mcg nebulizer every 12 hours  Start Advair discus 500-50 mcg 1 puff twice daily  Home Xopenex HFA MDI 2 puffs 4 times daily as needed as outpatient  Discontinue albuterol 2.5 mg nebulizers every 4 hours as needed  Prednisone 40 mg oral daily taper by 10 mg every 3 days till off  Benzonatate 200 mg 3 times a day as needed for cough  May discharge home  Follow-up in pulmonary office in 2 months with Dr. Metz with a chest x-ray PA and lateral view.    Thank You for allowing me to participate in this patient's care     Richy Metz MD      Note to the patient: The 21st Century Cures Act makes medical notes like these available to patients in the interest of transparency. However, be advised that this is a medical document. It is intended as peer to peer communication. It is written in medical language and may contain abbreviations or verbiage that are unfamiliar. It may appear blunt or direct. Medical documents are intended to carry relevant information, facts as evident, and clinical opinion of the practitioner.      Disclaimer: Components of this note were documented using voice  recognition system and are subject to errors not corrected at proofreading. Contact the author of this note for any clarifications

## 2024-10-05 NOTE — PROGRESS NOTES
[facility]   part of [facility]     Hospitalist Progress Note     Ben Lanza Patient Status:  Inpatient    1985 MRN [id number]   Location [facility] 5NW-A Attending Gabriele Stewart MD   Hosp Day # 8 PCP Larry Pisano MD     Chief Complaint: Pneumonitis    Subjective:     Patient doing well. Remained on room air overnight. No issues.     Objective:    Review of Systems:   ROS completed; pertinent positive and negatives stated in subjective.    Vital signs:  Temp:  [97.9 °F (36.6 °C)-98.5 °F (36.9 °C)] 98.5 °F (36.9 °C)  Pulse:  [66-90] 66  Resp:  [19-20] 19  BP: (104-135)/(54-79) 104/54  SpO2:  [90 %-94 %] 94 %    Physical Exam:    General: No acute distress  Respiratory: Coarse right base with improving aeration  Cardiovascular: S1, S2.  Abdomen: Soft  Neuro: No new focal deficits    Diagnostic Data:    Labs:  Recent Labs   Lab 10/02/24  0616 10/03/24  0651   WBC 15.6* 17.5*   HGB 16.2 16.1   MCV 86.8 87.7   .0 369.0       Recent Labs   Lab 24  0949 10/02/24  0616 10/03/24  0651   * 157* 126*   BUN 15 16 17   CREATSERUM 0.97 0.81 0.81   CA 9.3 9.1 8.9   ALB  --  4.1  --     137 136   K 4.1 4.4 4.2    105 104   CO2 23.0 23.0 24.0   ALKPHO  --  66  --    AST  --  12  --    ALT  --  58*  --    BILT  --  0.5  --    TP  --  6.8  --        Estimated Creatinine Clearance: 127.8 mL/min (based on SCr of 0.81 mg/dL).     No results for input(s): \"TROP\", \"TROPHS\", \"CK\" in the last 168 hours.    No results for input(s): \"PTP\", \"INR\" in the last 168 hours.           Imaging: Imaging data reviewed in Epic.    Medications:    predniSONE  40 mg Oral Daily with breakfast    arformoterol  15 mcg Nebulization 2 times daily    enoxaparin  40 mg Subcutaneous Daily    guaiFENesin ER  1,200 mg Oral BID    benzonatate  200 mg Oral TID    budesonide  0.5 mg Nebulization 2 times daily       Assessment & Plan:     #Acute hypoxic respiratory failure d/t pneumonitis from recent  viral infection vs pneumonia, MRSA nares neg, RVP neg, Legionella neg, ECHO EF: 60-65%  -Prednisone   -Nebs, need for home? Inhalers alone?  -Antitussives  -Oxygen - now on room air  -Incentive spirometry, flutter  -Will need repeat imaging as outpatient   -Pulmonary consult    #Steroid induced hyperglycemia    Plan of care discussed with patient and RN.    Home once cleared by pulmonary team.     Mike ASHLEY    Supplementary Documentation:     Quality:    DVT Mechanical Prophylaxis:     Early ambuation  DVT Pharmacologic Prophylaxis   Medication    enoxaparin (Lovenox) 40 MG/0.4ML SUBQ injection 40 mg                Code Status: Not on file  Owens: No urinary catheter in place  Owens Duration (in days):   Central line:    ELDER: 10/5/2024      The 21st Century Cures Act makes medical notes like these available to patients in the interest of transparency. Please be advised this is a medical document. Medical documents are intended to carry relevant information, facts as evident, and the clinical opinion of the practitioner. The medical note is intended as peer to peer communication and may appear blunt or direct. It is written in medical language and may contain abbreviations or verbiage that are unfamiliar.

## 2024-10-05 NOTE — PROGRESS NOTES
NURSING DISCHARGE NOTE    Discharged Home via Ambulatory.  Accompanied by Support staff  Belongings Taken by patient/family.    AVS explained. Answered all questions and concerns. PIV removed. VSS. No further needs a this time.

## 2024-10-05 NOTE — DISCHARGE SUMMARY
[facility]OSPITAL[facility]  DISCHARGE SUMMARY     Ben Lanza Patient Status:  Inpatient    1985 MRN [id number]   Location [facility] 5NW-A Attending John Beckwith MD   Hosp Day # 8 PCP Larry Pisano MD     Date of Admission: 2024  Date of Discharge:   10/5/2024    Discharge Disposition: Home    Discharge Diagnosis:  #Acute hypoxic respiratory failure d/t pneumonitis from recent viral infection vs pneumonia, MRSA nares neg, RVP neg, Legionella neg, ECHO EF: 60-65%  #Steroid induced hyperglycemia    History of Present Illness: Ben Lanza is a 39 year old male with a past medical history of chilhood asthma.  He smoked from age 13 until about 7 years ago.  +exposure to influenza at home. Family was improving and he was out of town.  Upon returning to [place] he started to have URI symptoms.  He saw his PCP and was started on PO ABX and inhalers.  He started to have increasing dyspnea and was referred to the ED due to hypoxia.      Brief Synopsis: Patient presented to the ER with difficulty breathing. He was admitted for acute hypoxic respiratory failure d/t pneumonitis from recent viral infection vs pneumonia. IV abx, steroids and BD initiated.  MRSA nares neg, RVP neg, Legionella neg, ECHO EF: 60-65%. Patient with slow response to therapy, but then began to make quick improvement. He completed abx therapy. He has weaned off oxygen and is over all doing well. Home on regimen outlined below.     Lace+ Score: 64  59-90 High Risk  29-58 Medium Risk  0-28   Low Risk  Patient was referred to the [facility].    TCM Follow-Up Recommendation:  LACE > 58: High Risk of readmission after discharge from the hospital.      Discharge Medication List:     Discharge Medications        START taking these medications        Instructions Prescription details   fluticasone-salmeterol 500-50 MCG/ACT Aepb  Commonly known as: Advair Diskus      Inhale 1 puff into the lungs 2 (two) times  daily.   Quantity: 60 each  Refills: 0     predniSONE 10 MG Tabs  Commonly known as: Deltasone      Take 4 tablets (40 mg total) by mouth daily with breakfast.   Quantity: 30 tablet  Refills: 0            CHANGE how you take these medications        Instructions Prescription details   benzonatate 200 MG Caps  Commonly known as: Tessalon  What changed:   when to take this  reasons to take this      Take 1 capsule (200 mg total) by mouth 3 (three) times daily as needed for cough.   Quantity: 30 capsule  Refills: 0            CONTINUE taking these medications        Instructions Prescription details   ibuprofen 200 MG Tabs  Commonly known as: Motrin      Take 4 tablets (800 mg total) by mouth every 8 (eight) hours as needed for Pain.   Refills: 0     Levalbuterol Tartrate 45 MCG/ACT Aero  Commonly known as: XOPENEX HFA      INHALE 2 PUFFS BY MOUTH UP TO FOUR TIMES DAILY AS NEEDED FOR SHORTNESS OF BREATH   Refills: 0            STOP taking these medications      albuterol 108 (90 Base) MCG/ACT Aers  Commonly known as: Ventolin HFA        amoxicillin clavulanate 875-125 MG Tabs  Commonly known as: Augmentin                  Where to Get Your Medications        These medications were sent to [id number] - [facility] [address] AT [facility] [address] [facility] [address] [phone], [phone]  [address] [facility] [address]      Phone: [phone]   benzonatate 200 MG Caps  fluticasone-salmeterol 500-50 MCG/ACT Aepb  predniSONE 10 MG Tabs         [place]    Follow-up appointment:   Richy Metz MD  [address] [place] [address] [phone]    Follow up in 2 month(s)      Larry Arreaga MD  [address] [facility] [address] [phone]    Follow up in 1 week(s)      Appointments for Next 30 Days 10/6/2024 - 11/5/2024        Date Arrival Time Visit Type Length Department Provider     10/9/2024 11:00 AM  NON-TCM HOSPITAL FOLLOW UP [5060] 30 min  [facility] Larry Arreaga MD    Patient Instructions:         Location Instructions:     Masks are optional for all patients and visitors, unless otherwise indicated.                          -----------------------------------------------------------------------------------------------  PATIENT DISCHARGE INSTRUCTIONS: See electronic chart    John Beckwith MD    Total time spent on discharge plannin minutes     The  Century Cures Act makes medical notes like these available to patients in the interest of transparency. Please be advised this is a medical document. Medical documents are intended to carry relevant information, facts as evident, and the clinical opinion of the practitioner. The medical note is intended as peer to peer communication and may appear blunt or direct. It is written in medical language and may contain abbreviations or verbiage that are unfamiliar.

## 2024-10-05 NOTE — PLAN OF CARE
Problem: Patient/Family Goals  Goal: Patient/Family Long Term Goal  Description: Patient's Long Term Goal: Discharge with adequate resources    Interventions:  - See additional Care Plan goals for specific interventions  Outcome: Progressing  Goal: Patient/Family Short Term Goal  Description: Patient's Short Term Goal:   9/28 AM: Wean O2 as tolerated  9/28noc: wean O2  9/29 AM: Wean O2 as tolerated  9/29 noc; wean oxygen  9/29 AM: wean O2 as tolerated  10/2 noc: Wean o2 as tolerated  10/3noc: wean O2  10/4 noc: stay on RA all night   Interventions:   -  -O2 walk  -IS  - See additional Care Plan goals for specific interventions  Outcome: Progressing     Problem: CARDIOVASCULAR - ADULT  Goal: Maintains optimal cardiac output and hemodynamic stability  Description: INTERVENTIONS:  - Monitor vital signs, rhythm, and trends  - Monitor for bleeding, hypotension and signs of decreased cardiac output  - Evaluate effectiveness of vasoactive medications to optimize hemodynamic stability  - Monitor arterial and/or venous puncture sites for bleeding and/or hematoma  - Assess quality of pulses, skin color and temperature  - Assess for signs of decreased coronary artery perfusion - ex. Angina  - Evaluate fluid balance, assess for edema, trend weights  Outcome: Progressing     Problem: RESPIRATORY - ADULT  Goal: Achieves optimal ventilation and oxygenation  Description: INTERVENTIONS:  - Assess for changes in respiratory status  - Assess for changes in mentation and behavior  - Position to facilitate oxygenation and minimize respiratory effort  - Oxygen supplementation based on oxygen saturation or ABGs  - Provide Smoking Cessation handout, if applicable  - Encourage broncho-pulmonary hygiene including cough, deep breathe, Incentive Spirometry  - Assess the need for suctioning and perform as needed  - Assess and instruct to report SOB or any respiratory difficulty  - Respiratory Therapy support as indicated  -  Manage/alleviate anxiety  - Monitor for signs/symptoms of CO2 retention  Outcome: Progressing

## 2024-10-05 NOTE — PLAN OF CARE
Patient is alert and oriented x4. Wdl on RA. Nebs. VSS. No c/o pain at his time. On lovenox. IV abx. Po steroids. Poc discussed with pt, pt verbalizes understanding.   Problem: Patient/Family Goals  Goal: Patient/Family Long Term Goal  Description: Patient's Long Term Goal: Discharge with adequate resources    Interventions:  - See additional Care Plan goals for specific interventions  Outcome: Progressing  Goal: Patient/Family Short Term Goal  Description: Patient's Short Term Goal:   9/28 AM: Wean O2 as tolerated  9/28noc: wean O2  9/29 AM: Wean O2 as tolerated  9/29 noc; wean oxygen  9/29 AM: wean O2 as tolerated  10/2 noc: Wean o2 as tolerated  10/3noc: wean O2  10/4 noc: stay on RA all night   Interventions:   -  -O2 walk  -IS  - See additional Care Plan goals for specific interventions  Outcome: Progressing     Problem: CARDIOVASCULAR - ADULT  Goal: Maintains optimal cardiac output and hemodynamic stability  Description: INTERVENTIONS:  - Monitor vital signs, rhythm, and trends  - Monitor for bleeding, hypotension and signs of decreased cardiac output  - Evaluate effectiveness of vasoactive medications to optimize hemodynamic stability  - Monitor arterial and/or venous puncture sites for bleeding and/or hematoma  - Assess quality of pulses, skin color and temperature  - Assess for signs of decreased coronary artery perfusion - ex. Angina  - Evaluate fluid balance, assess for edema, trend weights  Outcome: Progressing     Problem: RESPIRATORY - ADULT  Goal: Achieves optimal ventilation and oxygenation  Description: INTERVENTIONS:  - Assess for changes in respiratory status  - Assess for changes in mentation and behavior  - Position to facilitate oxygenation and minimize respiratory effort  - Oxygen supplementation based on oxygen saturation or ABGs  - Provide Smoking Cessation handout, if applicable  - Encourage broncho-pulmonary hygiene including cough, deep breathe, Incentive Spirometry  - Assess the need  for suctioning and perform as needed  - Assess and instruct to report SOB or any respiratory difficulty  - Respiratory Therapy support as indicated  - Manage/alleviate anxiety  - Monitor for signs/symptoms of CO2 retention  Outcome: Progressing

## 2024-10-07 ENCOUNTER — PATIENT OUTREACH (OUTPATIENT)
Dept: CASE MANAGEMENT | Age: 39
End: 2024-10-07

## 2024-10-07 NOTE — PAYOR COMM NOTE
--------------  DISCHARGE REVIEW    Payor: [employer]  Subscriber #:  [id number]  Authorization Number: [id number]     Admit date: 24  Admit time:   2:15 PM  Discharge Date: 10/5/2024 11:32 AM     Admitting Physician: Gabriele Stewart MD  Attending Physician:  No att. providers found  Primary Care Physician: Larry Arreaga MD          Discharge Summary Notes        Discharge Summary signed by John Beckwith MD at 10/6/2024 11:39 AM       Author: John Beckwith MD Specialty: HOSPITALIST, Internal Medicine Author Type: Physician    Filed: 10/6/2024 11:39 AM Date of Service: 10/5/2024  5:53 AM Status: Signed    : John Beckwith MD (Physician)           [facility]  DISCHARGE SUMMARY     Ben Lanza Patient Status:  Inpatient    1985 MRN [id number]   Location [facility] 5N-A Attending John Beckwith MD   Hosp Day # 8 PCP Larry Pisano MD     Date of Admission: 2024  Date of Discharge:   10/5/2024    Discharge Disposition: Home    Discharge Diagnosis:  #Acute hypoxic respiratory failure d/t pneumonitis from recent viral infection vs pneumonia, MRSA nares neg, RVP neg, Legionella neg, ECHO EF: 60-65%  #Steroid induced hyperglycemia    History of Present Illness: Ben Lanza is a 39 year old male with a past medical history of chilhood asthma.  He smoked from age 13 until about 7 years ago.  +exposure to influenza at home. Family was improving and he was out of town.  Upon returning to [place] he started to have URI symptoms.  He saw his PCP and was started on PO ABX and inhalers.  He started to have increasing dyspnea and was referred to the ED due to hypoxia.      Brief Synopsis: Patient presented to the ER with difficulty breathing. He was admitted for acute hypoxic respiratory failure d/t pneumonitis from recent viral infection vs pneumonia. IV abx, steroids and BD initiated.  MRSA nares neg, RVP neg, Legionella neg, ECHO EF: 60-65%. Patient with slow  response to therapy, but then began to make quick improvement. He completed abx therapy. He has weaned off oxygen and is over all doing well. Home on regimen outlined below.     Lace+ Score: 64  59-90 High Risk  29-58 Medium Risk  0-28   Low Risk  Patient was referred to the [facility].    TCM Follow-Up Recommendation:  LACE > 58: High Risk of readmission after discharge from the hospital.      Discharge Medication List:     Discharge Medications        START taking these medications        Instructions Prescription details   fluticasone-salmeterol 500-50 MCG/ACT Aepb  Commonly known as: Advair Diskus      Inhale 1 puff into the lungs 2 (two) times daily.   Quantity: 60 each  Refills: 0     predniSONE 10 MG Tabs  Commonly known as: Deltasone      Take 4 tablets (40 mg total) by mouth daily with breakfast.   Quantity: 30 tablet  Refills: 0            CHANGE how you take these medications        Instructions Prescription details   benzonatate 200 MG Caps  Commonly known as: Tessalon  What changed:   when to take this  reasons to take this      Take 1 capsule (200 mg total) by mouth 3 (three) times daily as needed for cough.   Quantity: 30 capsule  Refills: 0            CONTINUE taking these medications        Instructions Prescription details   ibuprofen 200 MG Tabs  Commonly known as: Motrin      Take 4 tablets (800 mg total) by mouth every 8 (eight) hours as needed for Pain.   Refills: 0     Levalbuterol Tartrate 45 MCG/ACT Aero  Commonly known as: XOPENEX HFA      INHALE 2 PUFFS BY MOUTH UP TO FOUR TIMES DAILY AS NEEDED FOR SHORTNESS OF BREATH   Refills: 0            STOP taking these medications      albuterol 108 (90 Base) MCG/ACT Aers  Commonly known as: Ventolin HFA        amoxicillin clavulanate 875-125 MG Tabs  Commonly known as: Augmentin                  Where to Get Your Medications        These medications were sent to [facility][id number] - [place], [place] - [address] AT [place]  OF [facility] ([address]), [phone], [phone]  [address] [facility] [address]      Phone: [phone]   benzonatate 200 MG Caps  fluticasone-salmeterol 500-50 MCG/ACT Aepb  predniSONE 10 MG Tabs         [place]    Follow-up appointment:   Richy Metz MD  [address]  [address]  [facility] [id number] [phone]    Follow up in 2 month(s)      Larry Arreaga MD  [address] [facility] [id number] [phone]    Follow up in 1 week(s)      Appointments for Next 30 Days 10/6/2024 - 2024        Date Arrival Time Visit Type Length Department Provider     10/9/2024 11:00 AM  NON-TCM HOSPITAL FOLLOW UP [5060] 30 min [facility] Larry Arreaga MD    Patient Instructions:         Location Instructions:     Masks are optional for all patients and visitors, unless otherwise indicated.                          -----------------------------------------------------------------------------------------------  PATIENT DISCHARGE INSTRUCTIONS: See electronic chart    John Beckwith MD    Total time spent on discharge plannin minutes     The  Century Cures Act makes medical notes like these available to patients in the interest of transparency. Please be advised this is a medical document. Medical documents are intended to carry relevant information, facts as evident, and the clinical opinion of the practitioner. The medical note is intended as peer to peer communication and may appear blunt or direct. It is written in medical language and may contain abbreviations or verbiage that are unfamiliar.     Electronically signed by John Beckwith MD on 10/6/2024 11:39 AM         REVIEWER COMMENTS

## 2024-10-07 NOTE — PROGRESS NOTES
Attempted to contact pt for TCM however no answer. Call continued to ring and did not go to a . [facility] to try again at a later time.

## 2024-10-10 ENCOUNTER — OFFICE VISIT (OUTPATIENT)
Dept: FAMILY MEDICINE CLINIC | Facility: CLINIC | Age: 39
End: 2024-10-10
Payer: COMMERCIAL

## 2024-10-10 VITALS
RESPIRATION RATE: 16 BRPM | HEIGHT: 71 IN | WEIGHT: 220 LBS | DIASTOLIC BLOOD PRESSURE: 82 MMHG | TEMPERATURE: 98 F | HEART RATE: 78 BPM | OXYGEN SATURATION: 97 % | SYSTOLIC BLOOD PRESSURE: 118 MMHG | BODY MASS INDEX: 30.8 KG/M2

## 2024-10-10 DIAGNOSIS — J18.9 PNEUMONIA OF BOTH LUNGS DUE TO INFECTIOUS ORGANISM, UNSPECIFIED PART OF LUNG: ICD-10-CM

## 2024-10-10 DIAGNOSIS — Z09 HOSPITAL DISCHARGE FOLLOW-UP: Primary | ICD-10-CM

## 2024-10-10 PROCEDURE — 3008F BODY MASS INDEX DOCD: CPT | Performed by: FAMILY MEDICINE

## 2024-10-10 PROCEDURE — 3079F DIAST BP 80-89 MM HG: CPT | Performed by: FAMILY MEDICINE

## 2024-10-10 PROCEDURE — 3074F SYST BP LT 130 MM HG: CPT | Performed by: FAMILY MEDICINE

## 2024-10-10 PROCEDURE — 99213 OFFICE O/P EST LOW 20 MIN: CPT | Performed by: FAMILY MEDICINE

## 2024-10-10 RX ORDER — ALBUTEROL SULFATE 90 UG/1
1-2 INHALANT RESPIRATORY (INHALATION) EVERY 6 HOURS PRN
Qty: 8.5 G | Refills: 0 | Status: SHIPPED | OUTPATIENT
Start: 2024-10-10

## 2024-10-10 RX ORDER — FLUTICASONE PROPIONATE AND SALMETEROL 500; 50 UG/1; UG/1
1 POWDER RESPIRATORY (INHALATION) 2 TIMES DAILY
Qty: 60 EACH | Refills: 0 | Status: SHIPPED | OUTPATIENT
Start: 2024-10-10

## 2024-10-10 NOTE — PROGRESS NOTES
Ben Lanza is a 39 year old male.   Chief Complaint   Patient presents with    Follow - Up     From hospital. Last week. Diagnosed with bilateral pneumonia.       HPI:    39-year-old male comes in follow-up from hospitalization due to bilateral pneumonia.  Patient states that he has been doing much better clinically been using his long-acting and short acting inhaler.  Patient states he needs a refill on his long-acting inhaler.  Patient is curious to know when he should be able to consider going back to work.  Patient denies any fever chills nausea and vomiting.  Patient states that his breathing is getting easier every day.  Has been using his short acting inhaler to help him through his day.  Has noticed that the usage has been been going down.  Overall patient feels much better than when he was last seen in his office.  Past Medical History:    Childhood asthma (HCC)     History reviewed. No pertinent surgical history.  Family History   Problem Relation Age of Onset    Hypertension Father     Anxiety Brother      Social History:  Social History     Socioeconomic History    Marital status: Single   Tobacco Use    Smoking status: Former     Current packs/day: 0.50     Types: Cigarettes    Smokeless tobacco: Never   Vaping Use    Vaping status: Never Used   Substance and Sexual Activity    Alcohol use: Yes     Alcohol/week: 12.0 standard drinks of alcohol     Types: 12 Cans of beer per week     Comment: socially    Drug use: Not Currently   Other Topics Concern    Caffeine Concern No    Stress Concern No     Social Drivers of Health     Financial Resource Strain: Not At Risk (10/15/2022)    Received from [facility]    Financial Resource Strain     How hard is it for you to pay for the very basics like food, housing, medical care, and heating?: Not hard at all   Food Insecurity: No Food Insecurity (9/27/2024)    Food Insecurity     Food Insecurity: Never true   Transportation Needs: No  Transportation Needs (9/27/2024)    Transportation Needs     Lack of Transportation: No   Housing Stability: Low Risk  (9/27/2024)    Housing Stability     Housing Instability: No     Allergies:  Allergies[1]   Current Meds:  Current Outpatient Medications   Medication Sig Dispense Refill    fluticasone-salmeterol 500-50 MCG/ACT Inhalation Aerosol Powder, Breath Activated Inhale 1 puff into the lungs 2 (two) times daily. 60 each 0    albuterol 108 (90 Base) MCG/ACT Inhalation Aero Soln Inhale 1-2 puffs into the lungs every 6 (six) hours as needed for Wheezing or Shortness of Breath. 8.5 g 0    benzonatate 200 MG Oral Cap Take 1 capsule (200 mg total) by mouth 3 (three) times daily as needed for cough. 30 capsule 0    predniSONE 10 MG Oral Tab Take 4 tablets (40 mg total) by mouth daily with breakfast. 30 tablet 0    ibuprofen 200 MG Oral Tab Take 4 tablets (800 mg total) by mouth every 8 (eight) hours as needed for Pain. (Patient not taking: Reported on 10/10/2024)          ROS:   GENERAL HEALTH: feels well otherwise  SKIN: denies any unusual skin lesions or rashes  RESPIRATORY: denies shortness of breath with exertion  CARDIOVASCULAR: denies chest pain on exertion  GI: denies abdominal pain and denies heartburn  NEURO: denies headaches    PHYSICAL EXAM:   /82 (BP Location: Left arm, Patient Position: Sitting, Cuff Size: adult)   Pulse 78   Temp 98.3 °F (36.8 °C)   Resp 16   Ht 5' 11\" (1.803 m)   Wt 220 lb (99.8 kg)   SpO2 97%   BMI 30.68 kg/m²   GENERAL HEALTH: well developed, well nourished, in no apparent distress  EYES: sclera anicteric, conjunctiva normal  HEENT: normocephalic; normal pharynx  NECK: supple; no JVD, no LAD  RESPIRATORY: clear to auscultation bilaterally, no tachypnea  CARDIOVASCULAR: S1, S2 normal, no S3, no S4; no click; no murmur  EXTREMITIES: no cyanosis, clubbing or edema, peripheral pulses intact  PSYCHIATRIC: alert and oriented x 3; affect appropriate      ASSESSMENT/ PLAN:      Diagnoses and all orders for this visit:    Hospital discharge follow-up    Pneumonia of both lungs due to infectious organism, unspecified part of lung    Other orders  -     fluticasone-salmeterol 500-50 MCG/ACT Inhalation Aerosol Powder, Breath Activated; Inhale 1 puff into the lungs 2 (two) times daily.  -     albuterol 108 (90 Base) MCG/ACT Inhalation Aero Soln; Inhale 1-2 puffs into the lungs every 6 (six) hours as needed for Wheezing or Shortness of Breath.    Clinically unremarkable examination, moving her very well.  Advised patient that it may take weeks to fully recover from pneumonia especially as aggressive as it was for him.  I was able to review his notes from the hospitalization.  At this point I would recommend continue with the long-acting inhaler for at least 2 more weeks and may consider albuterol to use as needed if after 2 weeks has barely needed any albuterol consider stopping the long-acting inhaler and see if he does no longer needs any more medication due to lung inflammation as it may be resolved at that time.  Patient should consider pneumonia vaccination after January 1.    The patient is to return to office in prn  The patient is to return to office for persistent or worsening signs and symptoms.   The proper use of medication and possible side effects discussed with patient.  An AVS was given to patient.  The patient verbalized understanding, agrees to treatment regimen and all questions were answered.        [1] No Known Allergies

## 2024-10-11 LAB — UR GALACT AG SCR: NEGATIVE

## 2024-10-14 ENCOUNTER — TELEPHONE (OUTPATIENT)
Dept: FAMILY MEDICINE CLINIC | Facility: CLINIC | Age: 39
End: 2024-10-14

## 2024-10-14 NOTE — TELEPHONE ENCOUNTER
Patient notified and verbalized understanding.   Patient will fax from to office. Office fax provided

## 2024-10-15 NOTE — TELEPHONE ENCOUNTER
Form has been filled out by Ysabel. Called patient, he will be picking up today. Copy of form sent to scanning.

## 2024-10-16 ENCOUNTER — MED REC SCAN ONLY (OUTPATIENT)
Dept: FAMILY MEDICINE CLINIC | Facility: CLINIC | Age: 39
End: 2024-10-16

## 2024-10-16 NOTE — PROGRESS NOTES
Multiple attempts to reach the pt with no returned phone call. Pt went to [facility] with PCP on 10/10/24, closing encounter.

## 2024-10-23 ENCOUNTER — OFFICE VISIT (OUTPATIENT)
Dept: FAMILY MEDICINE CLINIC | Facility: CLINIC | Age: 39
End: 2024-10-23
Payer: COMMERCIAL

## 2024-10-23 ENCOUNTER — TELEPHONE (OUTPATIENT)
Dept: FAMILY MEDICINE CLINIC | Facility: CLINIC | Age: 39
End: 2024-10-23

## 2024-10-23 VITALS
HEART RATE: 103 BPM | OXYGEN SATURATION: 95 % | WEIGHT: 226 LBS | SYSTOLIC BLOOD PRESSURE: 126 MMHG | HEIGHT: 71 IN | TEMPERATURE: 98 F | DIASTOLIC BLOOD PRESSURE: 72 MMHG | BODY MASS INDEX: 31.64 KG/M2

## 2024-10-23 DIAGNOSIS — J18.9 PNEUMONIA OF BOTH LOWER LOBES DUE TO INFECTIOUS ORGANISM: ICD-10-CM

## 2024-10-23 DIAGNOSIS — Z02.89 ENCOUNTER FOR COMPLETION OF FORM WITH PATIENT: Primary | ICD-10-CM

## 2024-10-23 PROCEDURE — 3074F SYST BP LT 130 MM HG: CPT | Performed by: FAMILY MEDICINE

## 2024-10-23 PROCEDURE — G2211 COMPLEX E/M VISIT ADD ON: HCPCS | Performed by: FAMILY MEDICINE

## 2024-10-23 PROCEDURE — 3078F DIAST BP <80 MM HG: CPT | Performed by: FAMILY MEDICINE

## 2024-10-23 PROCEDURE — 3008F BODY MASS INDEX DOCD: CPT | Performed by: FAMILY MEDICINE

## 2024-10-23 PROCEDURE — 99213 OFFICE O/P EST LOW 20 MIN: CPT | Performed by: FAMILY MEDICINE

## 2024-10-23 NOTE — PROGRESS NOTES
Ben Lanza is a 39 year old male.   Chief Complaint   Patient presents with    Other     Wants to know if he can go back to work.      HPI:    Pt here to get paperwork filled to return to work from hospitalization from [facility]. Doing well, no cocnrns  Past Medical History:    Childhood asthma (HCC)     History reviewed. No pertinent surgical history.  Family History   Problem Relation Age of Onset    Hypertension Father     Anxiety Brother      Social History:  Social History     Socioeconomic History    Marital status: Single   Tobacco Use    Smoking status: Former     Current packs/day: 0.50     Types: Cigarettes    Smokeless tobacco: Never   Vaping Use    Vaping status: Never Used   Substance and Sexual Activity    Alcohol use: Yes     Alcohol/week: 12.0 standard drinks of alcohol     Types: 12 Cans of beer per week     Comment: socially    Drug use: Not Currently   Other Topics Concern    Caffeine Concern No    Stress Concern No     Social Drivers of Health     Financial Resource Strain: Not At Risk (10/15/2022)    Received from [facility]    Financial Resource Strain     How hard is it for you to pay for the very basics like food, housing, medical care, and heating?: Not hard at all   Food Insecurity: No Food Insecurity (9/27/2024)    Food Insecurity     Food Insecurity: Never true   Transportation Needs: No Transportation Needs (9/27/2024)    Transportation Needs     Lack of Transportation: No   Housing Stability: Low Risk  (9/27/2024)    Housing Stability     Housing Instability: No     Allergies:  Allergies[1]   Current Meds:  Current Outpatient Medications   Medication Sig Dispense Refill    fluticasone-salmeterol 500-50 MCG/ACT Inhalation Aerosol Powder, Breath Activated Inhale 1 puff into the lungs 2 (two) times daily. 60 each 0    albuterol 108 (90 Base) MCG/ACT Inhalation Aero Soln Inhale 1-2 puffs into the lungs every 6 (six) hours as needed for Wheezing or Shortness of Breath.  8.5 g 0    predniSONE 10 MG Oral Tab Take 4 tablets (40 mg total) by mouth daily with breakfast. 30 tablet 0    ibuprofen 200 MG Oral Tab Take 4 tablets (800 mg total) by mouth every 8 (eight) hours as needed for Pain.      benzonatate 200 MG Oral Cap Take 1 capsule (200 mg total) by mouth 3 (three) times daily as needed for cough. (Patient not taking: Reported on 10/23/2024) 30 capsule 0        ROS:   GENERAL HEALTH: feels well otherwise  SKIN: denies any unusual skin lesions or rashes  RESPIRATORY: denies shortness of breath with exertion  CARDIOVASCULAR: denies chest pain on exertion  GI: denies abdominal pain and denies heartburn  NEURO: denies headaches    PHYSICAL EXAM:   /72 (BP Location: Left arm, Patient Position: Sitting, Cuff Size: adult)   Pulse 103   Temp 97.6 °F (36.4 °C)   Ht 5' 11\" (1.803 m)   Wt 226 lb (102.5 kg)   SpO2 95%   BMI 31.52 kg/m²   GENERAL HEALTH: well developed, well nourished, in no apparent distress  EYES: sclera anicteric, conjunctiva normal  HEENT: normocephalic; normal pharynx  NECK: supple; no JVD, no LAD  RESPIRATORY: clear to auscultation bilaterally, no tachypnea  CARDIOVASCULAR: S1, S2 normal, no S3, no S4; no click; no murmur  EXTREMITIES: no cyanosis, clubbing or edema, peripheral pulses intact  PSYCHIATRIC: alert and oriented x 3; affect appropriate      ASSESSMENT/ PLAN:     Diagnoses and all orders for this visit:    Encounter for completion of form with patient    Pneumonia of both lower lobes due to infectious organism    Form filled, ready to resume work on 10/28/24    The patient is to return to office in prn  The patient is to return to office for persistent or worsening signs and symptoms.   The proper use of medication and possible side effects discussed with patient.  An AVS was given to patient.  The patient verbalized understanding, agrees to treatment regimen and all questions were answered.        [1] No Known Allergies

## 2024-10-23 NOTE — TELEPHONE ENCOUNTER
Patient notified form has been completed.  Patient did not want faxed. Will  at office    Placed at  for   Copy to scanning

## 2025-02-25 PROBLEM — R09.02 HYPOXIA: Status: RESOLVED | Noted: 2024-09-27 | Resolved: 2025-02-25

## 2025-02-25 PROBLEM — J18.9 PNEUMONIA OF BOTH LUNGS DUE TO INFECTIOUS ORGANISM, UNSPECIFIED PART OF LUNG: Status: RESOLVED | Noted: 2024-09-27 | Resolved: 2025-02-25

## 2025-02-25 PROBLEM — J96.01 ACUTE HYPOXEMIC RESPIRATORY FAILURE (HCC): Status: RESOLVED | Noted: 2024-09-28 | Resolved: 2025-02-25

## 2025-02-26 ENCOUNTER — LAB ENCOUNTER (OUTPATIENT)
Dept: LAB | Age: 40
End: 2025-02-26
Attending: NURSE PRACTITIONER
Payer: COMMERCIAL

## 2025-02-26 DIAGNOSIS — F41.9 ANXIETY: ICD-10-CM

## 2025-02-26 DIAGNOSIS — R68.82 DECREASED SEX DRIVE: ICD-10-CM

## 2025-02-26 LAB
BASOPHILS # BLD AUTO: 0.07 X10(3) UL (ref 0–0.2)
BASOPHILS NFR BLD AUTO: 1 %
DEPRECATED HBV CORE AB SER IA-ACNC: 123 NG/ML
EOSINOPHIL # BLD AUTO: 0.37 X10(3) UL (ref 0–0.7)
EOSINOPHIL NFR BLD AUTO: 5.1 %
ERYTHROCYTE [DISTWIDTH] IN BLOOD BY AUTOMATED COUNT: 13.2 %
FSH SERPL-ACNC: 4.9 MIU/ML
HCT VFR BLD AUTO: 50.9 %
HGB BLD-MCNC: 17.3 G/DL
IMM GRANULOCYTES # BLD AUTO: 0.02 X10(3) UL (ref 0–1)
IMM GRANULOCYTES NFR BLD: 0.3 %
LH SERPL-ACNC: 9.2 MIU/ML
LYMPHOCYTES # BLD AUTO: 1.56 X10(3) UL (ref 1–4)
LYMPHOCYTES NFR BLD AUTO: 21.6 %
MCH RBC QN AUTO: 29.1 PG (ref 26–34)
MCHC RBC AUTO-ENTMCNC: 34 G/DL (ref 31–37)
MCV RBC AUTO: 85.5 FL
MONOCYTES # BLD AUTO: 0.6 X10(3) UL (ref 0.1–1)
MONOCYTES NFR BLD AUTO: 8.3 %
NEUTROPHILS # BLD AUTO: 4.61 X10 (3) UL (ref 1.5–7.7)
NEUTROPHILS # BLD AUTO: 4.61 X10(3) UL (ref 1.5–7.7)
NEUTROPHILS NFR BLD AUTO: 63.7 %
PLATELET # BLD AUTO: 225 10(3)UL (ref 150–450)
RBC # BLD AUTO: 5.95 X10(6)UL
T4 FREE SERPL-MCNC: 1.2 NG/DL (ref 0.8–1.7)
TSI SER-ACNC: 1.44 UIU/ML (ref 0.55–4.78)
WBC # BLD AUTO: 7.2 X10(3) UL (ref 4–11)

## 2025-02-26 PROCEDURE — 84439 ASSAY OF FREE THYROXINE: CPT | Performed by: NURSE PRACTITIONER

## 2025-02-26 PROCEDURE — 84403 ASSAY OF TOTAL TESTOSTERONE: CPT | Performed by: NURSE PRACTITIONER

## 2025-02-26 PROCEDURE — 83002 ASSAY OF GONADOTROPIN (LH): CPT | Performed by: NURSE PRACTITIONER

## 2025-02-26 PROCEDURE — 84443 ASSAY THYROID STIM HORMONE: CPT | Performed by: NURSE PRACTITIONER

## 2025-02-26 PROCEDURE — 83001 ASSAY OF GONADOTROPIN (FSH): CPT | Performed by: NURSE PRACTITIONER

## 2025-02-26 PROCEDURE — 84402 ASSAY OF FREE TESTOSTERONE: CPT | Performed by: NURSE PRACTITIONER

## 2025-02-26 PROCEDURE — 85025 COMPLETE CBC W/AUTO DIFF WBC: CPT | Performed by: NURSE PRACTITIONER

## 2025-02-26 PROCEDURE — 82728 ASSAY OF FERRITIN: CPT | Performed by: NURSE PRACTITIONER

## 2025-02-27 ENCOUNTER — MED REC SCAN ONLY (OUTPATIENT)
Dept: FAMILY MEDICINE CLINIC | Facility: CLINIC | Age: 40
End: 2025-02-27

## 2025-03-04 LAB
FREE TESTOST DIRECT: 7.3 PG/ML
TESTOSTERONE: 354 NG/DL

## 2025-03-05 ENCOUNTER — PATIENT MESSAGE (OUTPATIENT)
Dept: FAMILY MEDICINE CLINIC | Facility: CLINIC | Age: 40
End: 2025-03-05

## 2025-03-05 DIAGNOSIS — F41.9 ANXIETY: ICD-10-CM

## 2025-03-05 DIAGNOSIS — R68.82 DECREASED SEX DRIVE: ICD-10-CM

## 2025-03-05 RX ORDER — BUSPIRONE HYDROCHLORIDE 7.5 MG/1
7.5 TABLET ORAL 2 TIMES DAILY
Qty: 180 TABLET | Refills: 0 | Status: SHIPPED | OUTPATIENT
Start: 2025-03-05 | End: 2025-06-03

## 2025-03-27 ENCOUNTER — OFFICE VISIT (OUTPATIENT)
Dept: OCCUPATIONAL MEDICINE | Age: 40
End: 2025-03-27

## 2025-03-27 DIAGNOSIS — Z02.89 VISIT FOR OCCUPATIONAL HEALTH EXAMINATION: Primary | ICD-10-CM

## 2025-03-27 PROCEDURE — 92552 PURE TONE AUDIOMETRY AIR: CPT

## 2025-03-27 PROCEDURE — OH138 COMPREHENSIVE EYE EXAM

## 2025-08-21 RX ORDER — ALBUTEROL SULFATE 90 UG/1
1-2 INHALANT RESPIRATORY (INHALATION) EVERY 6 HOURS PRN
Qty: 8.5 G | Refills: 0 | Status: SHIPPED | OUTPATIENT
Start: 2025-08-21

## (undated) NOTE — LETTER
Date: 10/23/2024    Patient Name: Ben Lanza          To Whom it may concern:    This letter has been written at the patient's request. The above patient was seen at [facility] for treatment of a medical condition.    This patient should be excused from attending work/school  through 10/28/24.    The patient may return to work/school on 10/28/24 with the following limitations none.        Sincerely,    Larry Pisano MD

## (undated) NOTE — LETTER
06/19/18      Margo Durbin  6/16/1985    To whom it may concern,    Mr Familia Vu was seen in my office today for dehydration and lightheadedness. He may return to work tomorrow without restrictions. Sincerely,  Bartholomew Lundborg.  Deena Sherwood, [email or web address]